# Patient Record
Sex: FEMALE | Race: WHITE | NOT HISPANIC OR LATINO | Employment: FULL TIME | URBAN - METROPOLITAN AREA
[De-identification: names, ages, dates, MRNs, and addresses within clinical notes are randomized per-mention and may not be internally consistent; named-entity substitution may affect disease eponyms.]

---

## 2017-05-04 DIAGNOSIS — L08.89 PITTED KERATOLYSIS: ICD-10-CM

## 2017-05-04 RX ORDER — CLINDAMYCIN PHOSPHATE 10 MG/G
GEL TOPICAL
Qty: 60 G | Refills: 3 | Status: CANCELLED | OUTPATIENT
Start: 2017-05-04

## 2017-05-05 NOTE — TELEPHONE ENCOUNTER
Team coordinators-Please call patient.  Did she request this medication?  It is not currently an active medication on med list.    Thank you!  TRACY CaseyN, RN

## 2017-05-05 NOTE — TELEPHONE ENCOUNTER
Medication Detail      Disp Refills Start End PAYTON   clindamycin (CLINDAGEL) 1 % gel (Discontinued) 60 g 3 6/18/2015 5/23/2016 --   Sig: Apply small amount to soles of feet nightly   Class: E-Prescribe   Reason for Discontinue: Therapy completed   Order: [id number]        Last Office Visit with [facility], [facility] or [facility] prescribing provider: 11/27/2015

## 2017-05-08 DIAGNOSIS — L08.89 PITTED KERATOLYSIS: ICD-10-CM

## 2017-05-08 NOTE — TELEPHONE ENCOUNTER
Medication Detail      Disp Refills Start End PAYTON   aluminum chloride (DRYSOL) 20 % external solution (Discontinued) 35 mL 3 6/18/2015 5/23/2016 --   Sig: Apply topically At Bedtime Apply to dry skin at bedtime once daily, wash off in morning.        Last Office Visit with [facility], [facility] or [facility] prescribing provider: 11/27/15

## 2017-05-18 ENCOUNTER — MYC MEDICAL ADVICE (OUTPATIENT)
Dept: FAMILY MEDICINE | Facility: CLINIC | Age: 42
End: 2017-05-18

## 2017-05-18 DIAGNOSIS — L08.89 PITTED KERATOLYSIS: ICD-10-CM

## 2017-05-18 RX ORDER — CLINDAMYCIN PHOSPHATE 10 MG/G
GEL TOPICAL
Qty: 60 G | Refills: 3 | Status: SHIPPED | OUTPATIENT
Start: 2017-05-18 | End: 2018-06-20

## 2017-08-16 ENCOUNTER — MYC MEDICAL ADVICE (OUTPATIENT)
Dept: FAMILY MEDICINE | Facility: CLINIC | Age: 42
End: 2017-08-16

## 2018-02-20 ENCOUNTER — OFFICE VISIT (OUTPATIENT)
Dept: FAMILY MEDICINE | Facility: CLINIC | Age: 43
End: 2018-02-20
Payer: COMMERCIAL

## 2018-02-20 VITALS
BODY MASS INDEX: 33.99 KG/M2 | HEART RATE: 78 BPM | DIASTOLIC BLOOD PRESSURE: 82 MMHG | WEIGHT: 204 LBS | SYSTOLIC BLOOD PRESSURE: 133 MMHG | TEMPERATURE: 98.5 F | RESPIRATION RATE: 16 BRPM | HEIGHT: 65 IN

## 2018-02-20 DIAGNOSIS — G43.009 MIGRAINE WITHOUT AURA AND WITHOUT STATUS MIGRAINOSUS, NOT INTRACTABLE: Primary | ICD-10-CM

## 2018-02-20 DIAGNOSIS — F32.1 MODERATE MAJOR DEPRESSION (H): ICD-10-CM

## 2018-02-20 DIAGNOSIS — G89.29 CHRONIC INTRACTABLE HEADACHE, UNSPECIFIED HEADACHE TYPE: ICD-10-CM

## 2018-02-20 DIAGNOSIS — R51.9 CHRONIC INTRACTABLE HEADACHE, UNSPECIFIED HEADACHE TYPE: ICD-10-CM

## 2018-02-20 PROCEDURE — 99214 OFFICE O/P EST MOD 30 MIN: CPT | Performed by: FAMILY MEDICINE

## 2018-02-20 NOTE — MR AVS SNAPSHOT
"              After Visit Summary   2/20/2018    Ruthie Nash    MRN: [id number]           Patient Information     Date Of Birth          1975        Visit Information        Provider Department      2/20/2018 3:20 PM Julia Bashir MD [facility]        Today's Diagnoses     Migraine without aura and without status migrainosus, not intractable    -  1    Chronic intractable headache, unspecified headache type        Moderate major depression (H)           Follow-ups after your visit        Who to contact     If you have questions or need follow up information about today's clinic visit or your schedule please contact [facility] directly at [phone].  Normal or non-critical lab and imaging results will be communicated to you by MyChart, letter or phone within 4 business days after the clinic has received the results. If you do not hear from us within 7 days, please contact the clinic through [employer] or phone. If you have a critical or abnormal lab result, we will notify you by phone as soon as possible.  Submit refill requests through [employer] or call your pharmacy and they will forward the refill request to us. Please allow 3 business days for your refill to be completed.          Additional Information About Your Visit        MyChart Information     [employer] gives you secure access to your electronic health record. If you see a primary care provider, you can also send messages to your care team and make appointments. If you have questions, please call your primary care clinic.  If you do not have a primary care provider, please call [phone] and they will assist you.        Care EveryWhere ID     This is your Care EveryWhere ID. This could be used by other organizations to access your [facility] medical records  [id number]        Your Vitals Were     Pulse Temperature Respirations Height Last Period Breastfeeding?    78 98.5  F (36.9  C) (Oral) 16 5' 5\" (1.651 m) " 02/15/2018 (Approximate) Unknown    BMI (Body Mass Index)                   33.95 kg/m2            Blood Pressure from Last 3 Encounters:   02/20/18 133/82   06/08/16 99/64   05/24/16 105/67    Weight from Last 3 Encounters:   02/20/18 204 lb (92.5 kg)   06/08/16 183 lb (83 kg)   05/24/16 178 lb (80.7 kg)              We Performed the Following     DEPRESSION ACTION PLAN (DAP)        Primary Care Provider Office Phone # Fax #    Julia Bashir [phone] [address]  [facility] [address]        Equal Access to Services     BRENT WILEY : Hadii fly girono Poppy, waaxda lufilomenaadaha, qaybta kaalmada kiran, nicolas anderson . [facility] [phone].    ATENCIÓN: Si habla español, tiene a [place] disposición servicios gratuitos de asistencia lingüística. [facility] [phone].    We comply with applicable federal civil rights laws and [place] laws. We do not discriminate on the basis of race, color, national origin, age, disability, sex, sexual orientation, or gender identity.            Thank you!     Thank you for choosing [facility]  for your care. Our goal is always to provide you with excellent care. Hearing back from our patients is one way we can continue to improve our services. Please take a few minutes to complete the written survey that you may receive in the mail after your visit with us. Thank you!             Your Updated Medication List - Protect others around you: Learn how to safely use, store and throw away your medicines at www.disposemymeds.org.          This list is accurate as of 2/20/18 11:59 PM.  Always use your most recent med list.                   Brand Name Dispense Instructions for use Diagnosis    aluminum chloride 20 % external solution    DRYSOL    35 mL    Apply topically At Bedtime Apply to dry skin at bedtime once daily, wash off in morning.    Pitted keratolysis       clindamycin 1 % topical gel    CLINDAGEL     60 g    Apply small amount to soles of feet nightly    Pitted keratolysis       FISH OIL PO           PRENATAL VITAMINS PO           TYLENOL PO      Take 325-650 mg by mouth every 4 hours as needed for mild pain or fever        VITAMIN D (CHOLECALCIFEROL) PO      Take by mouth daily

## 2018-02-20 NOTE — LETTER
My Depression Action Plan  Name: Ruthie Nash   Date of Birth 1975  Date: 2/20/2018    My doctor: Julia Bashir   My clinic: [address] [address] [phone]          GREEN    ZONE   Good Control    What it looks like:     Things are going generally well. You have normal up s and down s. You may even feel depressed from time to time, but bad moods usually last less than a day.   What you need to do:  1. Continue to care for yourself (see self care plan)  2. Check your depression survival kit and update it as needed  3. Follow your physician s recommendations including any medication.  4. Do not stop taking medication unless you consult with your physician first.           YELLOW         ZONE Getting Worse    What it looks like:     Depression is starting to interfere with your life.     It may be hard to get out of bed; you may be starting to isolate yourself from others.    Symptoms of depression are starting to last most all day and this has happened for several days.     You may have suicidal thoughts but they are not constant.   What you need to do:     1. Call your care team, your response to treatment will improve if you keep your care team informed of your progress. Yellow periods are signs an adjustment may need to be made.     2. Continue your self-care, even if you have to fake it!    3. Talk to someone in your support network    4. Open up your depression survival kit           RED    ZONE Medical Alert - Get Help    What it looks like:     Depression is seriously interfering with your life.     You may experience these or other symptoms: You can t get out of bed most days, can t work or engage in other necessary activities, you have trouble taking care of basic hygiene, or basic responsibilities, thoughts of suicide or death that will not go away, self-injurious behavior.     What you need to do:  1. Call your care team  and request a same-day appointment. If they are not available (weekends or after hours) call your local crisis line, emergency room or 911.      Electronically signed by: Talisha Cohen, February 20, 2018    Depression Self Care Plan / Survival Kit    Self-Care for Depression  Here s the deal. Your body and mind are really not as separate as most people think.  What you do and think affects how you feel and how you feel influences what you do and think. This means if you do things that people who feel good do, it will help you feel better.  Sometimes this is all it takes.  There is also a place for medication and therapy depending on how severe your depression is, so be sure to consult with your medical provider and/ or Behavioral Health Consultant if your symptoms are worsening or not improving.     In order to better manage my stress, I will:    Exercise  Get some form of exercise, every day. This will help reduce pain and release endorphins, the  feel good  chemicals in your brain. This is almost as good as taking antidepressants!  This is not the same as joining a gym and then never going! (they count on that by the way ) It can be as simple as just going for a walk or doing some gardening, anything that will get you moving.      Hygiene   Maintain good hygiene (Get out of bed in the morning, Make your bed, Brush your teeth, Take a shower, and Get dressed like you were going to work, even if you are unemployed).  If your clothes don't fit try to get ones that do.    Diet  I will strive to eat foods that are good for me, drink plenty of water, and avoid excessive sugar, caffeine, alcohol, and other mood-altering substances.  Some foods that are helpful in depression are: complex carbohydrates, B vitamins, flaxseed, fish or fish oil, fresh fruits and vegetables.    Psychotherapy  I agree to participate in Individual Therapy (if recommended).    Medication  If prescribed medications, I agree to take them.  Missing  doses can result in serious side effects.  I understand that drinking alcohol, or other illicit drug use, may cause potential side effects.  I will not stop my medication abruptly without first discussing it with my provider.    Staying Connected With Others  I will stay in touch with my friends, family members, and my primary care provider/team.    Use your imagination  Be creative.  We all have a creative side; it doesn t matter if it s oil painting, sand castles, or mud pies! This will also kick up the endorphins.    Witness Beauty  (AKA stop and smell the roses) Take a look outside, even in mid-winter. Notice colors, textures. Watch the squirrels and birds.     Service to others  Be of service to others.  There is always someone else in need.  By helping others we can  get out of ourselves  and remember the really important things.  This also provides opportunities for practicing all the other parts of the program.    Humor  Laugh and be silly!  Adjust your TV habits for less news and crime-drama and more comedy.    Control your stress  Try breathing deep, massage therapy, biofeedback, and meditation. Find time to relax each day.     My support system    Clinic Contact:  Phone number:    Contact 1:  Phone number:    Contact 2:  Phone number:    Church/:  Phone number:    Therapist:  Phone number:    Local crisis center:    Phone number:    Other community support:  Phone number:

## 2018-02-20 NOTE — PROGRESS NOTES
SUBJECTIVE:   Ruthie Nash is a 42 year old female who presents to clinic today for the following health issues:    Headaches      Duration: 10 years     Description  Location: bilateral in the frontal area, bilateral in the temporal area   Character: dull pain  Frequency:  Once a mouth when pt has her period.    Duration:  1 week    Intensity:  moderate    Accompanying signs and symptoms:    Precipitating or Alleviating factors:  Nausea/vomiting: sometimes  Dizziness: no  Weakness or numbness: no  Visual changes: wavy/zigzag lines  Fever: no   Sinus or URI symptoms no     History  Head trauma: no   Family history of migraines: no   Previous tests for headaches: YES  Neurologist evaluations: no  Able to do daily activities when headache present: no  Wake with headaches: YES  Daily pain medication use: no    Therapies tried and outcome: Ibuprofen (Advil, Motrin)    Outcome - usually effective  Frequent/daily pain medication use: no    Depression  PHQ-9 SCORE 3/15/2010 10/10/2012 1/27/2015   Total Score 3 8 4    currently under good control, manages well, feels she is functioning well.    Patient Active Problem List   Diagnosis     Insomnia     Atopic rhinitis     Adjustment disorder with anxiety     CARDIOVASCULAR SCREENING; LDL GOAL LESS THAN 160     Migraine     Chronic headache     Hyperhidrosis of feet     Pitted keratolysis     Skin tag     Pain in joint, upper arm     Aftercare for healing traumatic fracture of upper arm     Other postprocedural status(V45.89)     Low back pain     Lumbago     Pain in joint, pelvic region and thigh     Moderate major depression (H)     Body aches     Vitamin D deficiency     Headache     LSIL (low grade squamous intraepithelial lesion) on Pap smear     Generalized anxiety disorder     Laryngitis     Cervicalgia     History reviewed. No pertinent surgical history.    Social History   Substance Use Topics     Smoking status: Never Smoker     Smokeless tobacco: Never Used      "Alcohol use Yes      Comment: social     Family History   Problem Relation Age of Onset     Allergies Mother      Hypertension Mother      MENTAL ILLNESS Mother      Hypertension Father      MENTAL ILLNESS Father      Family History Negative Paternal Grandmother      HEART DISEASE Maternal Grandmother      Obesity Maternal Grandmother      DIABETES Maternal Grandmother      CANCER Maternal Grandfather      HEART DISEASE Paternal Grandfather      Obesity Paternal Grandfather      DIABETES Paternal Grandfather      Family History Negative Sister      Family History Negative Brother      Family History Negative Brother          Allergies   Allergen Reactions     Hay Fever & [A.R.M.]      Mold      BP Readings from Last 3 Encounters:   02/20/18 133/82   06/08/16 99/64   05/24/16 105/67    Wt Readings from Last 3 Encounters:   02/20/18 204 lb (92.5 kg)   06/08/16 183 lb (83 kg)   05/24/16 178 lb (80.7 kg)                    Reviewed and updated as needed this visit by clinical staff       Reviewed and updated as needed this visit by Provider         ROS:  Constitutional, HEENT, cardiovascular, pulmonary, GI, , musculoskeletal, neuro, skin, endocrine and psych systems are negative, except as otherwise noted.    OBJECTIVE:     /82  Pulse 78  Temp 98.5  F (36.9  C) (Oral)  Resp 16  Ht 5' 5\" (1.651 m)  Wt 204 lb (92.5 kg)  LMP 02/15/2018 (Approximate)  Breastfeeding? Unknown  BMI 33.95 kg/m2  Body mass index is 33.95 kg/(m^2).  GENERAL: healthy, alert and no distress  EYES: Eyes grossly normal to inspection, PERRL and conjunctivae and sclerae normal  HENT: ear canals and TM's normal, nose and mouth without ulcers or lesions  NECK: no adenopathy, no asymmetry, masses, or scars and thyroid normal to palpation  RESP: lungs clear to auscultation - no rales, rhonchi or wheezes  CV: regular rate and rhythm, normal S1 S2, no S3 or S4, no murmur, click or rub, no peripheral edema and peripheral pulses strong  MS: no " gross musculoskeletal defects noted, no edema  SKIN: no suspicious lesions or rashes  NEURO: Normal strength and tone, sensory exam grossly normal, mentation intact, cranial nerves 2-12 intact and DTR's normal and symmetric , gait normal.  PSYCH: mentation appears normal, affect normal/bright    Diagnostic Test Results:  none     ASSESSMENT/PLAN:     1. Migraine without aura and without status migrainosus, not intractable  with  2. Chronic intractable headache, unspecified headache type  Discussed prescription options including abortive and prophylactic medications, she elected for over the counter treatments for now.    3. Moderate major depression (H)  Controlled, at goal  - DEPRESSION ACTION PLAN (DAP)      Julia Bashir MD  [facility]

## 2018-02-23 PROBLEM — R51.9 CHRONIC INTRACTABLE HEADACHE, UNSPECIFIED HEADACHE TYPE: Status: ACTIVE | Noted: 2018-02-23

## 2018-02-23 PROBLEM — G89.29 CHRONIC INTRACTABLE HEADACHE, UNSPECIFIED HEADACHE TYPE: Status: ACTIVE | Noted: 2018-02-23

## 2018-02-23 PROBLEM — G43.009 MIGRAINE WITHOUT AURA AND WITHOUT STATUS MIGRAINOSUS, NOT INTRACTABLE: Status: ACTIVE | Noted: 2018-02-23

## 2018-04-01 ENCOUNTER — HEALTH MAINTENANCE LETTER (OUTPATIENT)
Age: 43
End: 2018-04-01

## 2018-06-20 ENCOUNTER — MYC REFILL (OUTPATIENT)
Dept: FAMILY MEDICINE | Facility: CLINIC | Age: 43
End: 2018-06-20

## 2018-06-20 DIAGNOSIS — L08.89 PITTED KERATOLYSIS: ICD-10-CM

## 2018-06-20 NOTE — TELEPHONE ENCOUNTER
"Requested Prescriptions   Pending Prescriptions Disp Refills     clindamycin (CLINDAMAX) 1 % topical gel [Pharmacy Med Name: CLINDAMYCIN PHOSPHATE 1% GEL]  Last Written Prescription Date:  05/18/2017  Last Fill Quantity: 60 g,  # refills: 3   Last Office Visit: 2/20/2018   Future Office Visit:      60 g 3     Sig: APPLY A SMALL AMOUNT TO SOLES OF FEET NIGHTLY    Topical Acne Medications Protocol Passed    6/20/2018  9:14 AM       Passed - Patient is 12 years of age or older       Passed - Recent (12 mo) or future (30 days) visit within the authorizing provider's specialty    Patient had office visit in the last 12 months or has a visit in the next 30 days with authorizing provider or within the authorizing provider's specialty.  See \"Patient Info\" tab in inbasket, or \"Choose Columns\" in Meds & Orders section of the refill encounter.            DRYSOL 20 % external solution [Pharmacy Med Name: DRYSOL 20% SOLN]  Last Written Prescription Date:  05/08/2017  Last Fill Quantity: 35 mL,  # refills: 3   Last Office Visit: 2/20/2018   Future Office Visit:      35 mL 3     Sig: APPLY TOPICALLY TO DRY SKIN AT BEDTIME ONCE DAILY. WASH OFF IN MORNING.    Miscellaneous Dermatologic Agents Passed    6/20/2018  9:14 AM       Passed - Recent (12 mo) or future (30 days) visit within the authorizing provider's specialty    Patient had office visit in the last 12 months or has a visit in the next 30 days with authorizing provider or within the authorizing provider's specialty.  See \"Patient Info\" tab in inbasket, or \"Choose Columns\" in Meds & Orders section of the refill encounter.           Passed - Refill request is not for Imiquimod, 5-Fluorouracil, or Finasteride     If Imiquimod, 5-Fluorouracil, or Finasteride, may refill if indicated in progress notes.          Passed - Patient is 24 mos old or older          "

## 2018-06-20 NOTE — TELEPHONE ENCOUNTER
"Requested Prescriptions   Pending Prescriptions Disp Refills     aluminum chloride (DRYSOL) 20 % external solution  Last Written Prescription Date:  05/08/2017  Last Fill Quantity: 35 mL,  # refills: 3   Last Office Visit: 2/20/2018   Future Office Visit:      35 mL 3     Sig: Apply topically At Bedtime Apply to dry skin at bedtime once daily, wash off in morning.    Miscellaneous Dermatologic Agents Passed    6/20/2018  9:40 AM       Passed - Recent (12 mo) or future (30 days) visit within the authorizing provider's specialty    Patient had office visit in the last 12 months or has a visit in the next 30 days with authorizing provider or within the authorizing provider's specialty.  See \"Patient Info\" tab in inbasket, or \"Choose Columns\" in Meds & Orders section of the refill encounter.           Passed - Refill request is not for Imiquimod, 5-Fluorouracil, or Finasteride     If Imiquimod, 5-Fluorouracil, or Finasteride, may refill if indicated in progress notes.          Passed - Patient is 24 mos old or older        clindamycin (CLINDAGEL) 1 % topical gel  Last Written Prescription Date:  05/18/2017  Last Fill Quantity: 60 g,  # refills: 3   Last Office Visit: 2/20/2018   Future Office Visit:      60 g 3     Sig: Apply small amount to soles of feet nightly    Topical Acne Medications Protocol Passed    6/20/2018  9:40 AM       Passed - Patient is 12 years of age or older       Passed - Recent (12 mo) or future (30 days) visit within the authorizing provider's specialty    Patient had office visit in the last 12 months or has a visit in the next 30 days with authorizing provider or within the authorizing provider's specialty.  See \"Patient Info\" tab in inbasket, or \"Choose Columns\" in Meds & Orders section of the refill encounter.              "

## 2018-06-20 NOTE — TELEPHONE ENCOUNTER
Message from MyChart:  Original authorizing provider: MD Ruthie Soni Charity would like a refill of the following medications:  aluminum chloride (DRYSOL) 20 % external solution [Julia Basihr MD]  clindamycin (CLINDAGEL) 1 % topical gel [Julia Bashir MD]    Preferred pharmacy: [place], [place] - [address]    Comment:

## 2018-06-25 RX ORDER — ALUMINUM CHLORIDE 20 %
SOLUTION, NON-ORAL TOPICAL
Qty: 0.1 ML | Refills: 0 | OUTPATIENT
Start: 2018-06-25

## 2018-06-25 RX ORDER — CLINDAMYCIN PHOSPHATE 10 MG/G
GEL TOPICAL
Qty: 60 G | Refills: 1 | Status: SHIPPED | OUTPATIENT
Start: 2018-06-25 | End: 2019-08-13

## 2018-06-25 RX ORDER — CLINDAMYCIN PHOSPHATE 10 MG/G
GEL TOPICAL
Qty: 0.1 G | Refills: 0 | OUTPATIENT
Start: 2018-06-25

## 2018-06-28 ENCOUNTER — MYC MEDICAL ADVICE (OUTPATIENT)
Dept: FAMILY MEDICINE | Facility: CLINIC | Age: 43
End: 2018-06-28

## 2018-06-28 DIAGNOSIS — N92.0 EXCESSIVE OR FREQUENT MENSTRUATION: Primary | ICD-10-CM

## 2018-06-29 PROBLEM — N92.0 EXCESSIVE OR FREQUENT MENSTRUATION: Status: ACTIVE | Noted: 2018-06-29

## 2018-07-13 ENCOUNTER — OFFICE VISIT (OUTPATIENT)
Dept: OBGYN | Facility: CLINIC | Age: 43
End: 2018-07-13
Payer: COMMERCIAL

## 2018-07-13 VITALS
BODY MASS INDEX: 33.87 KG/M2 | HEIGHT: 65 IN | SYSTOLIC BLOOD PRESSURE: 134 MMHG | DIASTOLIC BLOOD PRESSURE: 88 MMHG | WEIGHT: 203.3 LBS | TEMPERATURE: 99.1 F | HEART RATE: 97 BPM

## 2018-07-13 DIAGNOSIS — G43.011 INTRACTABLE MIGRAINE WITHOUT AURA AND WITH STATUS MIGRAINOSUS: Primary | ICD-10-CM

## 2018-07-13 PROCEDURE — 99203 OFFICE O/P NEW LOW 30 MIN: CPT | Performed by: OBSTETRICS & GYNECOLOGY

## 2018-07-13 RX ORDER — PROPRANOLOL HYDROCHLORIDE 20 MG/1
20 TABLET ORAL 2 TIMES DAILY
Qty: 180 TABLET | Refills: 1 | Status: SHIPPED | OUTPATIENT
Start: 2018-07-13 | End: 2018-07-13

## 2018-07-13 RX ORDER — PROPRANOLOL HYDROCHLORIDE 20 MG/1
20 TABLET ORAL 2 TIMES DAILY
Qty: 180 TABLET | Refills: 1 | Status: SHIPPED | OUTPATIENT
Start: 2018-07-13 | End: 2019-06-21

## 2018-07-13 RX ORDER — ACETAMINOPHEN AND CODEINE PHOSPHATE 120; 12 MG/5ML; MG/5ML
SOLUTION ORAL
COMMUNITY
Start: 2017-10-20 | End: 2018-11-20

## 2018-07-13 NOTE — PROGRESS NOTES
CC/HPI:  43 year old  presents for discussion of   Insomnia  Headaches.  Does get vision changes, describes zigzag flashing lights that starts in the center of her vision and moves towards the periphery.  Present her whole life except during pregnancy. Insomnia and headaches were better. Has had a Mirena for a while, didn't change anything. Still has Mirena. Still gets periods.   Has been doing norethindrone for a few months. Has been skipping the fourth week thinking it was a placebo week and going straight into the next pack.  Does feel like she feels better in general on the norethindrone.  Headache usually right before/during her period.  Feels just generally unwell.      Past Medical History:   Diagnosis Date     Body aches      Chronic headache      LENORE (generalised anxiety disorder) 2015     Headache      Insomnia 12/10/2008     LSIL (low grade squamous intraepithelial lesion) on Pap smear     also +HPV, colps WNL x two       No past surgical history on file.    Family History   Problem Relation Age of Onset     Allergies Mother      Hypertension Mother      Mental Illness Mother      Hypertension Father      Mental Illness Father      Family History Negative Paternal Grandmother      HEART DISEASE Maternal Grandmother      Obesity Maternal Grandmother      Diabetes Maternal Grandmother      Cancer Maternal Grandfather      HEART DISEASE Paternal Grandfather      Obesity Paternal Grandfather      Diabetes Paternal Grandfather      Family History Negative Sister      Family History Negative Brother      Family History Negative Brother        Social History     Social History     Marital status: Single     Spouse name: N/A     Number of children: N/A     Years of education: N/A     Occupational History     Not on file.     Social History Main Topics     Smoking status: Never Smoker     Smokeless tobacco: Never Used     Alcohol use Yes      Comment: social     Drug use: No     Sexual activity:  "Yes     Partners: Male     Birth control/ protection: Inserts     Other Topics Concern     Parent/Sibling W/ Cabg, Mi Or Angioplasty Before 65f 55m? No     Social History Narrative         Current Outpatient Prescriptions:      Acetaminophen (TYLENOL PO), Take 325-650 mg by mouth every 4 hours as needed for mild pain or fever, Disp: , Rfl:      aluminum chloride (DRYSOL) 20 % external solution, Apply topically At Bedtime Apply to dry skin at bedtime once daily, wash off in morning., Disp: 35 mL, Rfl: 1     clindamycin (CLINDAGEL) 1 % topical gel, Apply small amount to soles of feet nightly, Disp: 60 g, Rfl: 1     levonorgestrel (MIRENA) 20 MCG/24HR IUD, 1 each by Intrauterine route once, Disp: , Rfl:      norethindrone (MICRONOR) 0.35 MG per tablet, , Disp: , Rfl:      Omega-3 Fatty Acids (FISH OIL PO), , Disp: , Rfl:      Prenatal Multivit-Min-Fe-FA (PRENATAL VITAMINS PO), , Disp: , Rfl:      VITAMIN D, CHOLECALCIFEROL, PO, Take by mouth daily, Disp: , Rfl:     Allergies   Allergen Reactions     Hay Fever & [A.R.M.]      Mold        Exam:  Vitals:    18 0802   BP: 134/88   Pulse: 97   Temp: 99.1  F (37.3  C)   TempSrc: Oral   Weight: 203 lb 4.8 oz (92.2 kg)   Height: 5' 5\" (1.651 m)     Body mass index is 33.83 kg/(m^2).     Exam:  Constitutional: healthy, alert and no distress  Head: Normocephalic. No masses, lesions, tenderness or abnormalities  Psychiatric: mentation appears normal and affect normal/bright      A/P:  43 year old  with menstrual migraines  Reviewed headache history the patient agree that she seems to have migraines associated with her menses.  Discussed that combined oral contraceptive pills are contraindicated given her history of visual aura with her migraines.  Discussed that suppression of menses with Mirena IUD asked at the end organ of the uterus and is not significantly changed circulating hormones.  The strategies designed to reduce the heaviness of the menses but would not " be expected to improve menstrual migraines.  Patient reports perceived improvement while on low-dose progestin only oral contraceptive pills.  There is no contraindication to continuing with this strategy.  We discussed how progestin only OCPs are organized and that it is the same pill throughout the 4 weeks of the medication.  We discussed strategies for prevention of migraine.  We discussed a trial of propanolol for prevention.  In chart review it appears she may have tried this in 2015 but Ruthie does not remember this trial, if she adhered to it, or if it was effective.   We discussed that some patients use chaste berry tree extract also called Vitex also called castus chirs for premenstrual dysphoric disorder.  We discussed that premenstrual dysphoric disorder can have physical and psychological manifestations.  We discussed that there have been a couple of randomized placebo controlled double blinded studies with chaste berry tree extract for premenstrual dysphoric disorder.  I have had many patients uses herbal supplement with minimal to no side effects or adverse effects.  I have had 1 or 2 patients experience worsening of their symptoms.    At the conclusion of this consultation Ruthie decided she would like to try propanolol for prevention of migraine.  We discussed starting at 20 mg twice daily, that it takes at least 3 months duration of a trial, and that the medication may need to be titrated up.  We discussed possibly adding chaste berry tree extract but to add one medication at a time.  Return to clinic in 3 months.    Greater than 50% of this 40 minute appointment was spent in counseling on above issues.

## 2018-07-13 NOTE — MR AVS SNAPSHOT
After Visit Summary   7/13/2018    Ruthie Nash    MRN: [id number]           Patient Information     Date Of Birth          1975        Visit Information        Provider Department      7/13/2018 8:00 AM Marissa Skaggs MD [facility]        Today's Diagnoses     Intractable migraine without aura and with status migrainosus    -  1       Follow-ups after your visit        Your next 10 appointments already scheduled     Oct 12, 2018  8:00 AM CDT   Office Visit with Marissa Skaggs MD   [facility] ([facility])    [address] [place] [id number] [phone]           Bring a current list of meds and any records pertaining to this visit. For Physicals, please bring immunization records and any forms needing to be filled out. Please arrive 10 minutes early to complete paperwork.              Who to contact     If you have questions or need follow up information about today's clinic visit or your schedule please contact [facility] directly at [phone].  Normal or non-critical lab and imaging results will be communicated to you by MyChart, letter or phone within 4 business days after the clinic has received the results. If you do not hear from us within 7 days, please contact the clinic through [email or web address] or phone. If you have a critical or abnormal lab result, we will notify you by phone as soon as possible.  Submit refill requests through DioGenix or call your pharmacy and they will forward the refill request to us. Please allow 3 business days for your refill to be completed.          Additional Information About Your Visit        MyChart Information     [employer] gives you secure access to your electronic health record. If you see a primary care provider, you can also send messages to your care team and make appointments. If you have questions, please call your primary care clinic.  If you do  "not have a primary care provider, please call [phone] and they will assist you.        Care EveryWhere ID     This is your Care EveryWhere ID. This could be used by other organizations to access your [facility] medical records  [id number]        Your Vitals Were     Pulse Temperature Height Last Period Breastfeeding? BMI (Body Mass Index)    97 99.1  F (37.3  C) (Oral) 5' 5\" (1.651 m) 06/20/2018 Yes 33.83 kg/m2       Blood Pressure from Last 3 Encounters:   07/13/18 134/88   02/20/18 133/82   06/08/16 99/64    Weight from Last 3 Encounters:   07/13/18 203 lb 4.8 oz (92.2 kg)   02/20/18 204 lb (92.5 kg)   06/08/16 183 lb (83 kg)              Today, you had the following     No orders found for display         Today's Medication Changes          These changes are accurate as of 7/13/18 11:59 PM.  If you have any questions, ask your nurse or doctor.               Start taking these medicines.        Dose/Directions    propranolol 20 MG tablet   Commonly known as:  INDERAL   Used for:  Intractable migraine without aura and with status migrainosus   Started by:  Marissa Skaggs MD        Dose:  20 mg   Take 1 tablet (20 mg) by mouth 2 times daily   Quantity:  180 tablet   Refills:  1            Where to get your medicines      These medications were sent to [facility] [address]  [address] [facility] [address]     Phone:  [phone]     propranolol 20 MG tablet                Primary Care Provider Office Phone # Fax #    Julia Bashir [phone]       [address]  [facility] [address]        Equal Access to Services     [facility]: Natalya Mcwilliams, waestebanda lujustina, qaabhijeetta kaalmada kiran, nicolas barnes. So [facility] [phone].    ATENCIÓN: Si habla español, tiene a [place] disposición servicios gratuitos de asistencia lingüística. Llame al [phone].    We comply with applicable " federal civil rights laws and [place] laws. We do not discriminate on the basis of race, color, national origin, age, disability, sex, sexual orientation, or gender identity.            Thank you!     Thank you for choosing [facility]  for your care. Our goal is always to provide you with excellent care. Hearing back from our patients is one way we can continue to improve our services. Please take a few minutes to complete the written survey that you may receive in the mail after your visit with us. Thank you!             Your Updated Medication List - Protect others around you: Learn how to safely use, store and throw away your medicines at www.disposemymeds.org.          This list is accurate as of 7/13/18 11:59 PM.  Always use your most recent med list.                   Brand Name Dispense Instructions for use Diagnosis    aluminum chloride 20 % external solution    DRYSOL    35 mL    Apply topically At Bedtime Apply to dry skin at bedtime once daily, wash off in morning.    Pitted keratolysis       clindamycin 1 % topical gel    CLINDAGEL    60 g    Apply small amount to soles of feet nightly    Pitted keratolysis       FISH OIL PO           levonorgestrel 20 MCG/24HR IUD    MIRENA     1 each by Intrauterine route once        norethindrone 0.35 MG per tablet    MICRONOR          PRENATAL VITAMINS PO           propranolol 20 MG tablet    INDERAL    180 tablet    Take 1 tablet (20 mg) by mouth 2 times daily    Intractable migraine without aura and with status migrainosus       TYLENOL PO      Take 325-650 mg by mouth every 4 hours as needed for mild pain or fever        VITAMIN D (CHOLECALCIFEROL) PO      Take by mouth daily

## 2018-11-27 ENCOUNTER — TRANSFERRED RECORDS (OUTPATIENT)
Dept: HEALTH INFORMATION MANAGEMENT | Facility: CLINIC | Age: 43
End: 2018-11-27

## 2018-12-04 ENCOUNTER — OFFICE VISIT (OUTPATIENT)
Dept: OBGYN | Facility: CLINIC | Age: 43
End: 2018-12-04
Payer: COMMERCIAL

## 2018-12-04 VITALS
WEIGHT: 188 LBS | HEART RATE: 99 BPM | DIASTOLIC BLOOD PRESSURE: 86 MMHG | BODY MASS INDEX: 31.28 KG/M2 | SYSTOLIC BLOOD PRESSURE: 128 MMHG

## 2018-12-04 DIAGNOSIS — G43.821 MENSTRUAL MIGRAINE WITH STATUS MIGRAINOSUS, NOT INTRACTABLE: Primary | ICD-10-CM

## 2018-12-04 DIAGNOSIS — Z30.017 INSERTION OF IMPLANTABLE SUBDERMAL CONTRACEPTIVE: ICD-10-CM

## 2018-12-04 PROCEDURE — 99213 OFFICE O/P EST LOW 20 MIN: CPT | Mod: 25 | Performed by: OBSTETRICS & GYNECOLOGY

## 2018-12-04 PROCEDURE — 11981 INSERTION DRUG DLVR IMPLANT: CPT | Performed by: OBSTETRICS & GYNECOLOGY

## 2018-12-04 NOTE — MR AVS SNAPSHOT
After Visit Summary   12/4/2018    Ruthie Nash    MRN: [id number]           Patient Information     Date Of Birth          1975        Visit Information        Provider Department      12/4/2018 8:30 AM Marissa Skaggs MD [facility]        Today's Diagnoses     Menstrual migraine with status migrainosus, not intractable    -  1    Insertion of implantable subdermal contraceptive           Follow-ups after your visit        Who to contact     If you have questions or need follow up information about today's clinic visit or your schedule please contact [facility] directly at [phone].  Normal or non-critical lab and imaging results will be communicated to you by MyChart, letter or phone within 4 business days after the clinic has received the results. If you do not hear from us within 7 days, please contact the clinic through Shop[employer]et or phone. If you have a critical or abnormal lab result, we will notify you by phone as soon as possible.  Submit refill requests through Scalix or call your pharmacy and they will forward the refill request to us. Please allow 3 business days for your refill to be completed.          Additional Information About Your Visit        MyChart Information     [employer] gives you secure access to your electronic health record. If you see a primary care provider, you can also send messages to your care team and make appointments. If you have questions, please call your primary care clinic.  If you do not have a primary care provider, please call [phone] and they will assist you.        Care EveryWhere ID     This is your Care EveryWhere ID. This could be used by other organizations to access your [facility] medical records  [id number]        Your Vitals Were     Pulse BMI (Body Mass Index)                99 31.28 kg/m2           Blood Pressure from Last 3 Encounters:   12/04/18 128/86   07/13/18 134/88   02/20/18 133/82     Weight from Last 3 Encounters:   12/04/18 188 lb (85.3 kg)   07/13/18 203 lb 4.8 oz (92.2 kg)   02/20/18 204 lb (92.5 kg)              We Performed the Following     ETONOGESTREL IMPLANT SYSTEM     INSERTION NON-BIODEGRADABLE DRUG DELIVERY IMPLANT          Today's Medication Changes          These changes are accurate as of 12/4/18 11:59 PM.  If you have any questions, ask your nurse or doctor.               Start taking these medicines.        Dose/Directions    etonogestrel 68 MG Impl   Commonly known as:  IMPLANON   Used for:  Insertion of implantable subdermal contraceptive   Started by:  Marissa Skaggs MD        Dose:  1 each   1 each (68 mg) by Subdermal route once for 1 dose   Quantity:  1 each   Refills:  0            Where to get your medicines      Information about where to get these medications is not yet available     ! Ask your nurse or doctor about these medications     etonogestrel 68 MG Impl                Primary Care Provider Office Phone # Fax #    Julia Bashir [phone] [address] [place] [address]        Equal Access to Services     [facility]: Hadii fly ku hadasho Socurtis, waaxda luqadaha, qaybta kaalmada adeegyapantera, nicolas anderson . So [facility] [phone].    ATENCIÓN: Si habla español, tiene a [place] disposición servicios gratuitos de asistencia lingüística. Llame al [phone].    We comply with applicable federal civil rights laws and [place] laws. We do not discriminate on the basis of race, color, national origin, age, disability, sex, sexual orientation, or gender identity.            Thank you!     Thank you for choosing [facility]  for your care. Our goal is always to provide you with excellent care. Hearing back from our patients is one way we can continue to improve our services. Please take a few minutes to complete the written survey that you may receive in the mail after your visit  with us. Thank you!             Your Updated Medication List - Protect others around you: Learn how to safely use, store and throw away your medicines at www.disposemymeds.org.          This list is accurate as of 12/4/18 11:59 PM.  Always use your most recent med list.                   Brand Name Dispense Instructions for use Diagnosis    aluminum chloride 20 % external solution    DRYSOL    35 mL    Apply topically At Bedtime Apply to dry skin at bedtime once daily, wash off in morning.    Pitted keratolysis       clindamycin 1 % external gel    CLINDAGEL    60 g    Apply small amount to soles of feet nightly    Pitted keratolysis       etonogestrel 68 MG Impl    IMPLANON    1 each    1 each (68 mg) by Subdermal route once for 1 dose    Insertion of implantable subdermal contraceptive       FISH OIL PO           levonorgestrel 20 MCG/24HR IUD    MIRENA     1 each by Intrauterine route once        norethindrone 0.35 MG tablet    MICRONOR    84 tablet    Take 1 tablet (0.35 mg) by mouth daily    Surveillance of previously prescribed contraceptive pill       PRENATAL VITAMINS PO           propranolol 20 MG tablet    INDERAL    180 tablet    Take 1 tablet (20 mg) by mouth 2 times daily    Intractable migraine without aura and with status migrainosus       TYLENOL PO      Take 325-650 mg by mouth every 4 hours as needed for mild pain or fever        VITAMIN D (CHOLECALCIFEROL) PO      Take by mouth daily

## 2018-12-04 NOTE — NURSING NOTE
"Chief Complaint   Patient presents with     Prenatal Care       Initial /86  Pulse 99  Wt 188 lb (85.3 kg)  BMI 31.28 kg/m2 Estimated body mass index is 31.28 kg/(m^2) as calculated from the following:    Height as of 18: 5' 5\" (1.651 m).    Weight as of this encounter: 188 lb (85.3 kg).  BP completed using cuff size: regular    Questioned patient about current smoking habits.  Pt. has never smoked.          The following HM Due: NONE      The following patient reported/Care Every where data was sent to:  P ABSTRACT QUALITY INITIATIVES [[id number]]        N/a      Natasha Noel MA       nexplanon insert  lot# [id number]  ndc # 7161-2290-77       "

## 2018-12-05 NOTE — PROGRESS NOTES
S:  Ruthie presents for follow-up of menstrual migraines.  She was last seen for this in 2018.  At that time we started propranolol for prophylaxis with intent to titrate up as needed she did take the propanolol since the last appointment but took it only daily for the first few months then realize she was supposed to be taking it twice a day.  She has been taking it twice a day for about the last month but has  noticed no difference in her headaches.  She has Mirena IUD in place.  She has been taking norethindrone in addition because she previously felt like it did help her headaches.  She would like to start combination oral contraceptive pills.  She has been tracking her.  And she has been getting migraines and sleep disturbance during her period every month.    O:  Vitals:    18 0841   BP: 128/86   Pulse: 99   Weight: 188 lb (85.3 kg)     Gen: well appearing    A/P:  43 year old  with menstrual migraines  Discussed that combined oral contraceptive pills are not an option for her given her history of migraine with visual neurologic aura.  This has been discussed with her previously.  Discussed that it is unclear if she has had an adequate trial of propanolol as she is not been taking the minimum recommended starting dose.  Discussed other options for attempt at management.  We could try suppressing her ovulation with the Nexplanon which is more effective at suppressing ovulation than oral contraceptive pills even combined.  The norethindrone progesterone only pills that she has been on and obviously not suppressing her ovulation that she is having regular monthly periods.  She asked about hysterectomy.  We discussed this is unlikely to help with her migraines unless she has bilateral oophorectomy which I would not recommend due to her age; she would likely experience a severe symptomatic surgical menopause.   After discussion today, Ruthie would like to try Nexplanon, which was placed.   Follow-up in  2019.    NEXPLANON INSERTION PROCEDURE    Ruthie Nash is a 43 year old  who presents for Nexplanon insertion. Indication for nexplanon insertion is suppression of ovulation. No LMP recorded.. The patient is currently using IUD Mirena  for contraception.     Tests:  UPT not performed due to POPs and IUD in place.     A complete discussion of the risks and benefits of Nexplanon use and the details of the insertion procedure was held with the patient. The anticipated bleeding profile was specifically discussed and patient voiced understanding. All questions were answered. A consent form was signed.      Prior to the beginning of the procedure the team paused to verify the patient's identity, as well as the procedure to be performed and the correct side/site. All equipment required was ready and available. The patient was positioned appropriately.     Preprocedure medications: 1% plain lidocaine, 3 ml  Nexplanon Lot # lot# [id number]  ndc # 8223-5921-22    Patient's allergies were confirmed. The patient was placed in the supine position with her left (non-dominant) arm flexed at the elbow, externally rotated, and placed with her wrist parallel to her ear. The insertion site was identified 8-10 cm above the medial epicondyle of the humerus at the inner aspect of the arm. The insertion site was marked with a sterile marker. The direction of insertion was also indicated with a diomedes a few centimeters proximal. The insertion area was cleaned with betadine swabs and anesthetized with 2 ml of 1% lidocaine without epinephrine along the planned insertion tunnel.  The Nexplanon applicator was removed from its blister. The needle shield was removed, maintaining the applicator upright. The white implant was visualized in the needle tip. Counter-traction was applied to the skin at the marked needle insertion site.  The tip of the needle was inserted at the site, beveled side up, at a 30-degree angle. The  applicator was then lowered to a horizontal position. While lifting the skin with the tip of the needle, the needle was inserted to its full length. The slider was unlocked and moved fully back to the stop.   The 4 cm venice was palpated under the skin. The patient also palpated the venice. A pressure bandage was applied with sterile gauze. The patient was instructed to remove the bangage in several hours and replace with a band-aid.    The user card was filled out and given to the patient to keep.  The Patient Chart Label was completed and sent for scanning.    PLAN:   The patient was asked to contact the clinic for any fever/chills/insertion site pain or heavy bleeding.     FOLLOW-UP:  She was asked to follow up for any problems.     20 min was spent with this patient that does not include time spent on the procedure and over 50 % of the 20 min was spent counseling on menstrual migraines

## 2019-01-02 ENCOUNTER — OFFICE VISIT (OUTPATIENT)
Dept: FAMILY MEDICINE | Facility: CLINIC | Age: 44
End: 2019-01-02
Payer: COMMERCIAL

## 2019-01-02 ENCOUNTER — TELEPHONE (OUTPATIENT)
Dept: FAMILY MEDICINE | Facility: CLINIC | Age: 44
End: 2019-01-02

## 2019-01-02 VITALS
OXYGEN SATURATION: 98 % | HEART RATE: 89 BPM | TEMPERATURE: 98.6 F | BODY MASS INDEX: 31.65 KG/M2 | SYSTOLIC BLOOD PRESSURE: 139 MMHG | WEIGHT: 190 LBS | HEIGHT: 65 IN | DIASTOLIC BLOOD PRESSURE: 96 MMHG | RESPIRATION RATE: 18 BRPM

## 2019-01-02 DIAGNOSIS — M62.838 TRAPEZIUS MUSCLE SPASM: Primary | ICD-10-CM

## 2019-01-02 PROCEDURE — 99213 OFFICE O/P EST LOW 20 MIN: CPT | Performed by: FAMILY MEDICINE

## 2019-01-02 RX ORDER — PREDNISONE 20 MG/1
20 TABLET ORAL DAILY
Qty: 5 TABLET | Refills: 0 | Status: SHIPPED | OUTPATIENT
Start: 2019-01-02 | End: 2019-01-07

## 2019-01-02 RX ORDER — BACLOFEN 10 MG/1
10 TABLET ORAL 3 TIMES DAILY
Qty: 30 TABLET | Refills: 0 | Status: SHIPPED | OUTPATIENT
Start: 2019-01-02 | End: 2019-02-01

## 2019-01-02 ASSESSMENT — MIFFLIN-ST. JEOR: SCORE: 1527.71

## 2019-01-02 NOTE — PATIENT INSTRUCTIONS
"ASSESSMENT AND PLAN  1. Trapezius muscle spasm  Continue ibuprofen 600mg daily for 4-5 days .    Start baclofen at night and can use up to three times daily.     Warm packs 3-4 times daily for 15 minutes.     Taught non-weight bearing stretches to do 3-4 times/day.   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     If needing work note will call me Friday.     Pap smear due by February.     - baclofen (LIORESAL) 10 MG tablet; Take 1 tablet (10 mg) by mouth 3 times daily  Dispense: 30 tablet; Refill: 0  - [facility] PT, HAND, AND CHIROPRACTIC REFERRAL; Future        MYCHART FOR ON-LINE CARE(VISITS), LABS, REFILLS, MESSAGING, ETC [email or web address] , [phone]    E-VISIT: click \"on-line care, then request e-visit\".  E-visits work well for following up on issues we have discussed in clinic previously which may need new prescriptions, new prescriptions or substantial discussion. These are always done by me (Dr. Wegener).     ONCARE VISIT:  [email or web address]  - we treat nearly 50 common conditions through on-care.  These are done in an hour by on-call staff.     RADIOLOGY:  [facility]:  [phone]   [facility]: [phone]    Mammogram and Colonoscopy [phone]    Smoking Cessation: www.[email or web address], 3-907-627-PLAN (8187)      CONSUMER PRICE LINE for estimates of test costs:  [phone]       "

## 2019-01-02 NOTE — PROGRESS NOTES
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   History of back problems: no prior back problems  Any previous MRI or X-rays: None  Sees a specialist for back pain:  No  Therapies tried without relief: heat    Alleviating factors:   Improved by: None      Precipitating factors:  Worsened by: Sitting , standing  Lying           Problem list, Medication list, Allergies, and Medical/Social/Surgical histories reviewed in [facility] and updated as appropriate.  Labs reviewed in [facility]  BP Readings from Last 3 Encounters:   01/02/19 (!) 139/96   12/04/18 128/86   07/13/18 134/88    Wt Readings from Last 3 Encounters:   01/02/19 86.2 kg (190 lb)   12/04/18 85.3 kg (188 lb)   07/13/18 92.2 kg (203 lb 4.8 oz)                  Patient Active Problem List   Diagnosis     Insomnia     Atopic rhinitis     Adjustment disorder with anxiety     CARDIOVASCULAR SCREENING; LDL GOAL LESS THAN 160     Hyperhidrosis of feet     Pitted keratolysis     Skin tag     Pain in joint, upper arm     Aftercare for healing traumatic fracture of upper arm     Other postprocedural status(V45.89)     Low back pain     Lumbago     Pain in joint, pelvic region and thigh     Moderate major depression (H)     Body aches     Vitamin D deficiency     Headache     LSIL (low grade squamous intraepithelial lesion) on Pap smear     Generalized anxiety disorder     Laryngitis     Cervicalgia     Migraine without aura and without status migrainosus, not intractable     Chronic  intractable headache, unspecified headache type     Excessive or frequent menstruation     History reviewed. No pertinent surgical history.    Social History     Tobacco Use     Smoking status: Never Smoker     Smokeless tobacco: Never Used   Substance Use Topics     Alcohol use: Yes     Comment: social     Family History   Problem Relation Age of Onset     Allergies Mother      Hypertension Mother      Mental Illness Mother      Hypertension Father      Mental Illness Father      Family History Negative Paternal Grandmother      Heart Disease Maternal Grandmother      Obesity Maternal Grandmother      Diabetes Maternal Grandmother      Cancer Maternal Grandfather      Heart Disease Paternal Grandfather      Obesity Paternal Grandfather      Diabetes Paternal Grandfather      Family History Negative Sister      Family History Negative Brother      Family History Negative Brother          Current Outpatient Medications   Medication Sig Dispense Refill     Acetaminophen (TYLENOL PO) Take 325-650 mg by mouth every 4 hours as needed for mild pain or fever       aluminum chloride (DRYSOL) 20 % external solution Apply topically At Bedtime Apply to dry skin at bedtime once daily, wash off in morning. 35 mL 1     baclofen (LIORESAL) 10 MG tablet Take 1 tablet (10 mg) by mouth 3 times daily 30 tablet 0     clindamycin (CLINDAGEL) 1 % topical gel Apply small amount to soles of feet nightly 60 g 1     levonorgestrel (MIRENA) 20 MCG/24HR IUD 1 each by Intrauterine route once       norethindrone (MICRONOR) 0.35 MG per tablet Take 1 tablet (0.35 mg) by mouth daily 84 tablet 0     Omega-3 Fatty Acids (FISH OIL PO)        Prenatal Multivit-Min-Fe-FA (PRENATAL VITAMINS PO)        propranolol (INDERAL) 20 MG tablet Take 1 tablet (20 mg) by mouth 2 times daily 180 tablet 1     VITAMIN D, CHOLECALCIFEROL, PO Take by mouth daily       etonogestrel (IMPLANON) 68 MG IMPL 1 each (68 mg) by Subdermal route once for 1 dose 1 each 0  "    Allergies   Allergen Reactions     Hay Fever & [A.R.M.]      Mold      Recent Labs   Lab Test 10/11/12  1528   TSH 1.77        ROS:  Constitutional, HEENT, cardiovascular, pulmonary, GI, , musculoskeletal, neuro, skin, endocrine and psych systems are negative, except as otherwise noted.        OBJECTIVE:  BP (!) 139/96 (BP Location: Left arm, Patient Position: Supine, Cuff Size: Adult Regular)   Pulse 89   Temp 98.6  F (37  C) (Oral)   Resp 18   Ht 1.651 m (5' 5\")   Wt 86.2 kg (190 lb)   SpO2 98%   BMI 31.62 kg/m      EXAM:  GENERAL APPEARANCE: healthy, alert and no distress  Pain with palpation of right trapezius and rhomboid muscles which reproduces pain and radiating pain down right arm.    Less c-spine pine although some but not focal.     Strength 5/5 shoulder, elbow, wrist, hand, pincer grasp although does increase pain.   dtrs elbow,br, 1+/symmetric.           ASSESSMENT AND PLAN  Patient Instructions   ASSESSMENT AND PLAN  1. Trapezius muscle spasm  Continue ibuprofen 600mg daily for 4-5 days .    Start baclofen at night and can use up to three times daily.     Warm packs 3-4 times daily for 15 minutes.     Taught non-weight bearing stretches to do 3-4 times/day.   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     If needing work note will call me Friday.     Pap smear due by February with dr. Bashir.     Re-check blood pressue at physical. High today likely from pain, not high in past.     - baclofen (LIORESAL) 10 MG tablet; Take 1 tablet (10 mg) by mouth 3 times daily  Dispense: 30 tablet; Refill: 0  - [facility] PT, HAND, AND CHIROPRACTIC REFERRAL; Future        MYCHART FOR ON-LINE CARE(VISITS), LABS, REFILLS, MESSAGING, ETC [email or web address] , [phone]    E-VISIT: click \"on-line care, then request e-visit\".  E-visits work well for following up on issues we have discussed in clinic previously which may need new prescriptions, new prescriptions or substantial discussion. These are " always done by me (Dr. Wegener).     ONCARE VISIT:  [email or web address]  - we treat nearly 50 common conditions through on-care.  These are done in an hour by on-call staff.     RADIOLOGY:  [facility]:  [phone]   [facility]: [phone]    Mammogram and Colonoscopy [phone]    Smoking Cessation: www.quitplan.org, 9-690-128-PLAN (9091)      CONSUMER PRICE LINE for estimates of test costs:  [phone]             Joel Wegener, MD

## 2019-01-02 NOTE — TELEPHONE ENCOUNTER
Patient called and spoke with writer.    Per patient:  1. Five days of pain   A. Location: right upper back/side and up through neck  2. RUE fracture history but has never experienced pain like this  3. Pain worsened this AM  4. Does not think she injured self picking up her youngest-she picks child up frequently  5. Just took 2 Advil  6. Tingling of RUE for 5 days as well  7. No difficulty moving RUE  8. Denied loss of bowel/bladder function  9. Had professional massage on 12/29/18  10. Epsom salt baths  11. Heat/ice packs to affected area and stretching    Writer recommended:  1. Office visit for further evaluation.  Appt scheduled today with Dr. Wegener.  Appt date, time and location confirmed with patient  2. If pain becomes severe and/or loss of bowel/bladder function, go to emergency room for evaluation  3. Continue with home care measures  4. Call back with any new, worsening or changing symptoms    Patient verbalized understanding and in agreement with plan.  ALDO Xavier, TRACYN, RN

## 2019-01-03 ENCOUNTER — OFFICE VISIT (OUTPATIENT)
Dept: ORTHOPEDICS | Facility: CLINIC | Age: 44
End: 2019-01-03
Payer: COMMERCIAL

## 2019-01-03 ENCOUNTER — ANCILLARY PROCEDURE (OUTPATIENT)
Dept: GENERAL RADIOLOGY | Facility: CLINIC | Age: 44
End: 2019-01-03
Payer: COMMERCIAL

## 2019-01-03 VITALS — BODY MASS INDEX: 31.65 KG/M2 | HEART RATE: 92 BPM | WEIGHT: 190 LBS | OXYGEN SATURATION: 99 % | HEIGHT: 65 IN

## 2019-01-03 DIAGNOSIS — M54.2 NECK PAIN: Primary | ICD-10-CM

## 2019-01-03 DIAGNOSIS — M54.2 NECK PAIN: ICD-10-CM

## 2019-01-03 RX ORDER — TIZANIDINE 2 MG/1
2-4 TABLET ORAL 3 TIMES DAILY PRN
Qty: 30 TABLET | Refills: 0 | Status: SHIPPED | OUTPATIENT
Start: 2019-01-03 | End: 2021-01-11

## 2019-01-03 RX ORDER — PREDNISONE 20 MG/1
40 TABLET ORAL DAILY
Qty: 10 TABLET | Refills: 0 | Status: SHIPPED | OUTPATIENT
Start: 2019-01-03 | End: 2019-01-08

## 2019-01-03 ASSESSMENT — MIFFLIN-ST. JEOR: SCORE: 1527.71

## 2019-01-03 NOTE — PROGRESS NOTES
"[facility]  Orthopedics  Praneeth Hill MD  2019     Name: Ruthie Nash  MRN: [id number]  Age: 43 year old  : 1975  Referring provider: No ref. provider found     Chief Complaint: Pain of the Neck and Pain of the Right Arm         History of Present Illness:   Ruthie Nash is a 43 year old female who presents today for evaluation of cervical spine pain. She reports that her pain began about a week ago without inciting event or trauma. She localizes her pain on the right side of her neck and also endorses a tingling sensation in her right arm. Describes pain as a persistent \"shooting\" pain that wakes her up at night. Alleviates her pain by laying down. She has also tried epsom salt baths, heating pads, and stretching to minimal relief. She was seen yesterday at [facility] and was given a referral to physical therapy as well as stretching exercises to perform at home. She was prescribed baclofen and prednisone and has taken 3 doses of baclofen, but does not feel that it has helped. Has not started prednisone at this time.       Review of Systems:   A 10-point review of systems was obtained and is negative except for as noted in the HPI.     Medications:     Current Outpatient Medications:      Acetaminophen (TYLENOL PO), Take 325-650 mg by mouth every 4 hours as needed for mild pain or fever, Disp: , Rfl:      aluminum chloride (DRYSOL) 20 % external solution, Apply topically At Bedtime Apply to dry skin at bedtime once daily, wash off in morning., Disp: 35 mL, Rfl: 1     baclofen (LIORESAL) 10 MG tablet, Take 1 tablet (10 mg) by mouth 3 times daily, Disp: 30 tablet, Rfl: 0     clindamycin (CLINDAGEL) 1 % topical gel, Apply small amount to soles of feet nightly, Disp: 60 g, Rfl: 1     etonogestrel (IMPLANON) 68 MG IMPL, 1 each (68 mg) by Subdermal route once for 1 dose, Disp: 1 each, Rfl: 0     levonorgestrel (MIRENA) 20 MCG/24HR IUD, 1 each by Intrauterine route once, Disp: , Rfl:      norethindrone " "(MICRONOR) 0.35 MG per tablet, Take 1 tablet (0.35 mg) by mouth daily, Disp: 84 tablet, Rfl: 0     Omega-3 Fatty Acids (FISH OIL PO), , Disp: , Rfl:      predniSONE (DELTASONE) 20 MG tablet, Take 20 mg by mouth daily for 5 days., Disp: 5 tablet, Rfl: 0     Prenatal Multivit-Min-Fe-FA (PRENATAL VITAMINS PO), , Disp: , Rfl:      propranolol (INDERAL) 20 MG tablet, Take 1 tablet (20 mg) by mouth 2 times daily, Disp: 180 tablet, Rfl: 1     VITAMIN D, CHOLECALCIFEROL, PO, Take by mouth daily, Disp: , Rfl:     Allergies:  Hay fever  Mold    Past Medical History:  CBody aches   Chronic headache   LENORE  Headache   Insomnia   LSIL on Pap smear     Past Surgical History:  The patient does not have any pertinent past surgical history.    Social History:  Never smoked  Occasional alcohol use       Family History:  Mother: Allergies, hypertension, mental illness  Father: Hypertension, mental illness  Paternal grandmother: Heart disease, obesity, diabetes  Maternal grandfather: Cancer, obesity, diabetes    Physical Examination:  Pulse 92, height 1.651 m (5' 5\"), weight 86.2 kg (190 lb), SpO2 99 %, currently breastfeeding.   General: alert, pleasant, no distress  Neck/Spine: no TTP of spinous processes in cervical spine. Limited ROM with flexion, extension, rotation, tilting  with pain. negative TTP along paraspinous muscles and upper trapezius musculature along the right side. negative Periscapular pain.  negative tightness in this area. No noted bruising or skin discoloration. Spurlings negative  Neurologic: Reports altered sensation throughout right upper extremity compared to left. Strength 5/5 and symmetric throughout upper extremities.   Upper Extremities: warm, well perfused, no edema. Full ROM without pain in shoulder, elbow, wrist, and hand. Good capillary refill bilaterally      Imaging:   Radiographs of the cervical spine - 3 views (01/03/2019)  No osseus abnormalities.     I have independently reviewed the above imaging " studies; the results were discussed with the patient.       Assessment:   43 year old female with neck pain without acute injury or trauma with associated altered sensation in her right arm. Suspected cervical disc etiology..     Plan:   - Recommend the patient treat her pain with a 5 day course of prednisone 40mg, and tizanidine.  - I also recommend the patient continue staying active. She already has a physical therapy appointment scheduled at [place] for next Tuesday.   - If her symptoms do not improve, we may consider obtaining an MRI for further evaluation.     Praneeth Hill MD        Scribe Disclosure:   I, Cordell Warner, am serving as a scribe to document services personally performed by Praneeth Hill MD at this visit, based upon the provider's statements to me. All documentation has been reviewed by the aforementioned provider prior to being entered into the official medical record.

## 2019-01-03 NOTE — PROGRESS NOTES
SPORTS & ORTHOPEDIC WALK-IN VISIT 1/3/2019    Primary Care Physician: Dr. Bashir  Right upper trap/neck pain that started about a week ago. Seen at [facility] yesterday 1/2/19. Given referall to PT, note to be off work for 2 days, stretches, Rx for Baclofen and Prednisone. Has not taken any yet. Has tried Tylenol and Ibuprofen. Radiculopathy into R arm.      Reason for visit:     What part of your body is injured / painful?  Neck     What caused the injury /pain? No inciting event     How long ago did your injury occur or pain begin? a week ago    What are your most bothersome symptoms? Pain    How would you characterize your symptom?  dull and sharp    What makes your symptoms better? Heat    What makes your symptoms worse? Standing and Sitting, Lying down    Have you been previously seen for this problem? Yes, [facility]    Medical History:    Any recent changes to your medical history? No    Any new medication prescribed since last visit? No    Have you had surgery on this body part before? No    Social History:    Occupation: Therapist at [employer]    Handedness: Right    Exercise: 2-3 days/week    Review of Systems:    Do you have fever, chills, weight loss? No    Do you have any vision problems? No    Do you have any chest pain or edema? No    Do you have any shortness of breath or wheezing?  No    Do you have stomach problems? No    Do you have any numbness or focal weakness? Yes, down R arm    Do you have diabetes? No    Do you have problems with bleeding or clotting? No    Do you have an rashes or other skin lesions? No

## 2019-01-03 NOTE — LETTER
"1/3/2019       RE: Ruthie Nash  [address]  [place] [address]     Dear Colleague,    Thank you for referring your patient, Ruthie Nash, to the [facility] SPORTS AND ORTHOPAEDIC WALK IN CLINIC at [facility]. Please see a copy of my visit note below.    [facility]  Orthopedics  Praneeth Hill MD  2019     Name: Ruthie Nash  MRN: [id number]  Age: 43 year old  : 1975  Referring provider: No ref. provider found     Chief Complaint: Pain of the Neck and Pain of the Right Arm       History of Present Illness:   Ruthie Nash is a 43 year old female who presents today for evaluation of cervical spine pain. She reports that her pain began about a week ago without inciting event or trauma. She localizes her pain on the right side of her neck and also endorses a tingling sensation in her right arm. Describes pain as a persistent \"shooting\" pain that wakes her up at night. Alleviates her pain by laying down. She has also tried epsom salt baths, heating pads, and stretching to minimal relief. She was seen yesterday at [facility] and was given a referral to physical therapy as well as stretching exercises to perform at home. She was prescribed baclofen and prednisone and has taken 3 doses of baclofen, but does not feel that it has helped. Has not started prednisone at this time.       Review of Systems:   A 10-point review of systems was obtained and is negative except for as noted in the HPI.     Medications:   Current Outpatient Medications:      Acetaminophen (TYLENOL PO), Take 325-650 mg by mouth every 4 hours as needed for mild pain or fever, Disp: , Rfl:      aluminum chloride (DRYSOL) 20 % external solution, Apply topically At Bedtime Apply to dry skin at bedtime once daily, wash off in morning., Disp: 35 mL, Rfl: 1     baclofen (LIORESAL) 10 MG tablet, Take 1 tablet (10 mg) by mouth 3 times daily, Disp: 30 tablet, Rfl: 0     clindamycin (CLINDAGEL) 1 % topical " "gel, Apply small amount to soles of feet nightly, Disp: 60 g, Rfl: 1     etonogestrel (IMPLANON) 68 MG IMPL, 1 each (68 mg) by Subdermal route once for 1 dose, Disp: 1 each, Rfl: 0     levonorgestrel (MIRENA) 20 MCG/24HR IUD, 1 each by Intrauterine route once, Disp: , Rfl:      norethindrone (MICRONOR) 0.35 MG per tablet, Take 1 tablet (0.35 mg) by mouth daily, Disp: 84 tablet, Rfl: 0     Omega-3 Fatty Acids (FISH OIL PO), , Disp: , Rfl:      predniSONE (DELTASONE) 20 MG tablet, Take 20 mg by mouth daily for 5 days., Disp: 5 tablet, Rfl: 0     Prenatal Multivit-Min-Fe-FA (PRENATAL VITAMINS PO), , Disp: , Rfl:      propranolol (INDERAL) 20 MG tablet, Take 1 tablet (20 mg) by mouth 2 times daily, Disp: 180 tablet, Rfl: 1     VITAMIN D, CHOLECALCIFEROL, PO, Take by mouth daily, Disp: , Rfl:     Allergies:  Hay fever  Mold    Past Medical History:  CBody aches   Chronic headache   LENORE  Headache   Insomnia   LSIL on Pap smear     Past Surgical History:  The patient does not have any pertinent past surgical history.    Social History:  Never smoked  Occasional alcohol use       Family History:  Mother: Allergies, hypertension, mental illness  Father: Hypertension, mental illness  Paternal grandmother: Heart disease, obesity, diabetes  Maternal grandfather: Cancer, obesity, diabetes    Physical Examination:  Pulse 92, height 1.651 m (5' 5\"), weight 86.2 kg (190 lb), SpO2 99 %, currently breastfeeding.   General: alert, pleasant, no distress  Neck/Spine: no TTP of spinous processes in cervical spine. Limited ROM with flexion, extension, rotation, tilting  with pain. negative TTP along paraspinous muscles and upper trapezius musculature along the right side. negative Periscapular pain.  negative tightness in this area. No noted bruising or skin discoloration. Spurlings negative  Neurologic: Reports altered sensation throughout right upper extremity compared to left. Strength 5/5 and symmetric throughout upper extremities. "   Upper Extremities: warm, well perfused, no edema. Full ROM without pain in shoulder, elbow, wrist, and hand. Good capillary refill bilaterally    Imaging:   Radiographs of the cervical spine - 3 views (01/03/2019)  No osseus abnormalities.     I have independently reviewed the above imaging studies; the results were discussed with the patient.     Assessment:   43 year old female with neck pain without acute injury or trauma with associated altered sensation in her right arm. Suspected cervical disc etiology..     Plan:   - Recommend the patient treat her pain with a 5 day course of prednisone 40mg, and tizanidine.  - I also recommend the patient continue staying active. She already has a physical therapy appointment scheduled at [place] for next Tuesday.   - If her symptoms do not improve, we may consider obtaining an MRI for further evaluation.     Praneeth Hill MD    Scribe Disclosure:   I, Cordell Warner, am serving as a scribe to document services personally performed by Praneeth Hill MD at this visit, based upon the provider's statements to me. All documentation has been reviewed by the aforementioned provider prior to being entered into the official medical record.            SPORTS & ORTHOPEDIC WALK-IN VISIT 1/3/2019    Primary Care Physician: Dr. Bashir  Right upper trap/neck pain that started about a week ago. Seen at [facility] yesterday 1/2/19. Given referall to PT, note to be off work for 2 days, stretches, Rx for Baclofen and Prednisone. Has not taken any yet. Has tried Tylenol and Ibuprofen. Radiculopathy into R arm.      Reason for visit:     What part of your body is injured / painful?  Neck     What caused the injury /pain? No inciting event     How long ago did your injury occur or pain begin? a week ago    What are your most bothersome symptoms? Pain    How would you characterize your symptom?  dull and sharp    What makes your symptoms better? Heat    What makes your symptoms worse? Standing  and Sitting, Lying down    [place]    Medical History:    Any recent changes to your medical history? No    Any new medication prescribed since last visit? No    Have you had surgery on this body part before? No    Social History:    Occupation: Therapist at [employer]    Handedness: Right    Exercise: 2-3 days/week    Review of Systems:    Do you have fever, chills, weight loss? No    Do you have any vision problems? No    Do you have any chest pain or edema? No    Do you have any shortness of breath or wheezing?  No    Do you have stomach problems? No    Do you have any numbness or focal weakness? Yes, down R arm    Do you have diabetes? No    Do you have problems with bleeding or clotting? No    Do you have an rashes or other skin lesions? No

## 2019-01-04 ENCOUNTER — MYC MEDICAL ADVICE (OUTPATIENT)
Dept: ORTHOPEDICS | Facility: CLINIC | Age: 44
End: 2019-01-04

## 2019-01-08 ENCOUNTER — THERAPY VISIT (OUTPATIENT)
Dept: PHYSICAL THERAPY | Facility: CLINIC | Age: 44
End: 2019-01-08
Payer: COMMERCIAL

## 2019-01-08 DIAGNOSIS — M62.838 TRAPEZIUS MUSCLE SPASM: ICD-10-CM

## 2019-01-08 DIAGNOSIS — M54.2 CERVICALGIA: Primary | ICD-10-CM

## 2019-01-08 PROCEDURE — 97110 THERAPEUTIC EXERCISES: CPT | Mod: GP | Performed by: PHYSICAL THERAPIST

## 2019-01-08 PROCEDURE — 97161 PT EVAL LOW COMPLEX 20 MIN: CPT | Mod: GP | Performed by: PHYSICAL THERAPIST

## 2019-01-08 PROCEDURE — 97530 THERAPEUTIC ACTIVITIES: CPT | Mod: GP | Performed by: PHYSICAL THERAPIST

## 2019-01-08 NOTE — PROGRESS NOTES
Physical Therapy Initial Evaluation  January 8, 2019     Precautions/Restrictions/MD instructions: PT eval and treat.    Subjective:   Date of Onset: 1/2/19  MD order on 1/2/19  C/C: right sided neck pain, and anterior arm pain from axilla to her elbow. Also reports increased muscle soreness on the right side of the posterior shoulder. Denies numbness but reports tingling in her forearm and all fingers intermittently.  Quality of pain is dull and aching. Pains are described as intermittent in nature. Pain is worse: morning. Pain is rated 6/10.   History of symptoms: Pains began suddenly as the result of sleeping wrong and waking up with the pain at home. Since onset, symptoms are worsening. Previous episodes: history of left humerus fracture  Worsened by:  Sitting, driving, laying down, turning her head to the right  Alleviated by: standing. Laying flat on her back. Corticosteroids pack. Chiropractor.  General health as reported by patient: good  Pertinent medical/surgical history: overweight, migraines/headaches. A month and a half ago she got a progesterone implant in her left arm to reduce inflammation. She denies painful cough, painful peripheral motor deficit, recent inging in the ears or pain with swallowing, unexplained weight loss, significant current illness or recent hospital admission. She denies any regional implanted devices. She denies history of surgery in the area.  Imaging: none. Current occupational status: MH therapist. Patient's goals are: decrease pain, improve tolerance to driving, sitting, taking care of kids, and sitting at work for 45 min intervals. Return to MD:  PRN.     Objective:  CERVICAL:    Posture: forward shoulder and shoulder elevation in sitting  Posture Correction: lumbar roll, not well tolerated    Neurological:    Motor Deficit:  Myotomes L R   C4 (shoulder elevation) 5/5 5/5   C5 (shoulder abduction) 5/5 5/5   C6 (elbow flexion) 5/5 5/5   C7 (elbow extension) 5/5 3+/5   C8  (thumb extension) 5/5 5/5   T1 (finger add/abd) 5/5 5/5    Strength (lb) WNL WNL     Sensory Deficit, Reflexes, Dural Signs: Reduced light touch sensation at right C6-7 dermatomes, intact to light touch sensation in all other UE dermatomes. Brachioradialis, triceps and biceps reflex 1+ B.    AROM: (Major, Moderate, Minimal or Nil loss)  Movement Loss Ildefonso Mod Min Nil Pain   Protrusion   x     Flexion  x   x   Retraction   x     Extension x    xx   Left Rotation  x   x   Right Rotation x    xx   Left Side Bending   x     Right Side bending x    x     Repeated movement testing:   (During: produces, abolishes, increases, decreases, no effect, centralizing, peripheralizing; After: better, worse, no better, no worse, no effect, centralized, peripheralized)       Symptoms During Symptoms After ROM increased ROM decreased No Effect   RET produce No worse   x   Rep RET produce No worse X, improved R elbow extension       Static Tests:   Alar Ligament test: negative  Transverse Ligament Test:negative  Distraction:positive for sx reduction at right axilla and tingling in R UE    Palpation: tender to palpation at B upper traps, levator scapulae, cervical paraspinals and suboccipitals.    Assessment/Plan:    The patient is a 43 year old female with chief complaint of right neck, and anterior axillary pain, and tingling in R UE.    The patient has the following significant findings with corresponding treatment plan.  Diagnosis 1:  cervicalgia    Pain -  hot/cold therapy, electric stimulation, mechanical traction, manual therapy, splint/taping/bracing/orthotics, self management, education, directional preference exercise and home program  Decreased ROM/flexibility - manual therapy and therapeutic exercise  Decreased strength - therapeutic exercise and therapeutic activities  Decreased proprioception - neuro re-education and therapeutic activities  Impaired muscle performance - neuro re-education  Decreased function -  therapeutic activities  Impaired posture - neuro re-education    Therapy Evaluation Codes:   1) History comprised of:   Personal factors that impact the plan of care:      Age, Work status and mom of 3 young children.    Comorbidity factors that impact the plan of care are:      Migraines/headaches and Overweight.     Medications impacting care: Muscle relaxant and Steroids.  2) Examination of Body Systems comprised of:   Body structures and functions that impact the plan of care:      Cervical spine, Shoulder and Thoracic Spine.   Activity limitations that impact the plan of care are:      Bathing, Cooking, Driving, Dressing, Lifting, Reading/Computer work, Sitting, Standing, Walking, Working, Sleeping and Laying down.   Clinical presentation characteristics are:    Stable/Uncomplicated.  3) Presentation comprised of:   Presentation scored as Low complexity with uncomplicated characteristics..  4) Decision-Making    Low complexity using standardized patient assessment instrument and/or measureable assessment of functional outcome.  Cumulative Therapy Evaluation is: Low complexity.    Previous and current functional limitations:  (See Goal Flow Sheet for this information)    Short term and Long term goals: (See Goal Flow Sheet for this information)     Communication ability:  Patient appears to be able to clearly communicate and understand verbal and written communication and follow directions correctly.  Treatment Explanation - The following has been discussed with the patient: RX ordered/plan of care, anticipated outcomes, and possible risks and side effects.  This patient would benefit from PT intervention to resume normal activities.   Rehab potential is good.    Frequency:  1 X week, once daily  Duration:  for 8 weeks  Discharge Plan: Achieve all LTGs, be independent in home treatment program, and reach maximal therapeutic benefit.    Please refer to the daily flowsheet for treatment today, total treatment time  and time spent performing 1:1 timed codes.

## 2019-01-10 ENCOUNTER — MYC MEDICAL ADVICE (OUTPATIENT)
Dept: ORTHOPEDICS | Facility: CLINIC | Age: 44
End: 2019-01-10

## 2019-01-10 DIAGNOSIS — M54.2 NECK PAIN: Primary | ICD-10-CM

## 2019-01-16 ENCOUNTER — THERAPY VISIT (OUTPATIENT)
Dept: PHYSICAL THERAPY | Facility: CLINIC | Age: 44
End: 2019-01-16
Payer: COMMERCIAL

## 2019-01-16 DIAGNOSIS — M54.2 CERVICALGIA: Primary | ICD-10-CM

## 2019-01-16 DIAGNOSIS — M54.2 CERVICAL PAIN: Primary | ICD-10-CM

## 2019-01-16 PROCEDURE — 97110 THERAPEUTIC EXERCISES: CPT | Mod: GP | Performed by: PHYSICAL THERAPIST

## 2019-01-16 PROCEDURE — 97140 MANUAL THERAPY 1/> REGIONS: CPT | Mod: GP | Performed by: PHYSICAL THERAPIST

## 2019-01-16 RX ORDER — IBUPROFEN 600 MG/1
600 TABLET, FILM COATED ORAL EVERY 6 HOURS PRN
Qty: 18 TABLET | Refills: 0 | Status: SHIPPED | OUTPATIENT
Start: 2019-01-16 | End: 2020-01-16

## 2019-01-16 RX ORDER — CYCLOBENZAPRINE HCL 5 MG
5 TABLET ORAL 3 TIMES DAILY PRN
Qty: 15 TABLET | Refills: 0 | Status: SHIPPED | OUTPATIENT
Start: 2019-01-16 | End: 2019-01-26

## 2019-01-18 ENCOUNTER — THERAPY VISIT (OUTPATIENT)
Dept: PHYSICAL THERAPY | Facility: CLINIC | Age: 44
End: 2019-01-18
Payer: COMMERCIAL

## 2019-01-18 DIAGNOSIS — M54.12 CERVICAL RADICULOPATHY: Primary | ICD-10-CM

## 2019-01-18 PROCEDURE — 97530 THERAPEUTIC ACTIVITIES: CPT | Mod: GP | Performed by: PHYSICAL THERAPIST

## 2019-01-18 PROCEDURE — 97110 THERAPEUTIC EXERCISES: CPT | Mod: GP | Performed by: PHYSICAL THERAPIST

## 2019-01-18 PROCEDURE — 97012 MECHANICAL TRACTION THERAPY: CPT | Mod: GP | Performed by: PHYSICAL THERAPIST

## 2019-01-29 ENCOUNTER — TELEPHONE (OUTPATIENT)
Dept: FAMILY MEDICINE | Facility: CLINIC | Age: 44
End: 2019-01-29

## 2019-01-29 ENCOUNTER — VIRTUAL VISIT (OUTPATIENT)
Dept: FAMILY MEDICINE | Facility: CLINIC | Age: 44
End: 2019-01-29
Payer: COMMERCIAL

## 2019-01-29 DIAGNOSIS — Z20.828 EXPOSURE TO INFLUENZA: Primary | ICD-10-CM

## 2019-01-29 PROCEDURE — 99207 ZZC NO BILLABLE SERVICE THIS VISIT: CPT | Performed by: FAMILY MEDICINE

## 2019-01-29 RX ORDER — OSELTAMIVIR PHOSPHATE 75 MG/1
75 CAPSULE ORAL DAILY
Qty: 10 CAPSULE | Refills: 0 | Status: SHIPPED | OUTPATIENT
Start: 2019-01-29 | End: 2019-08-07

## 2019-01-29 NOTE — TELEPHONE ENCOUNTER
Patient's child, José Miguel Alexis (9-) was seen today for the flu.  Dr Hayes wanted her to do an e-visit to get prophylactic TamiFlu for the rest of the family:  Ruthie Nash (1977)  Sanchez Alexis (4-)  Rachid Alexis (5-7-2011)  Tianna Alexis (11-)    Ruthie stated that she was unable to initiate an e-visit (Dr Hayes was not an option).    Needs these called into [facility].      Please call when done or if you have any questions.  OK to leave message on voicemail.

## 2019-01-29 NOTE — TELEPHONE ENCOUNTER
Dr. Hayes-Please advise if you would be willing to do telephone visit with patient and family members.    Thank you!  TRACY DolanN, RN

## 2019-01-29 NOTE — TELEPHONE ENCOUNTER
Per Dr. Hayes:  1. Will complete telephone visits with patient and family members  2. Sanchez will need to have separate telephone visit  3. Rachid and Tianna telephone visits can be completed when calling patient    Writer called patient and informed her of above and reviewed consent needs to be obtained.    Patient verbalized understanding and in agreement with plan.    Patient stated she thinks she is starting to develop influenza symptoms-sore throat.  informed patient to notify Dr. Hayes what symptoms she is experiencing.    ALDO Xavier, TRACYN, RN

## 2019-01-29 NOTE — PROGRESS NOTES
Start phone call - 4:37 pm   End phone call - 4:41 pm   She feels tingling in her throat and her heads feel off.   Discussed treatment options with pt and her children.   Sent script to pharmacy.   Follow if symptoms worsen or fail to improve.    oseltamivir (TAMIFLU) 75 MG capsule 10 capsule 0 1/29/2019 2/8/2019 --   Sig - Route: Take 1 capsule (75 mg) by mouth daily for 10 days - Oral   Sent to pharmacy as: oseltamivir (TAMIFLU) 75 MG capsule   Class: E-Prescribe   Order: [id number]   E-Prescribing Status: Receipt confirmed by pharmacy (1/29/2019  4:05 PM CST)

## 2019-02-01 ENCOUNTER — THERAPY VISIT (OUTPATIENT)
Dept: PHYSICAL THERAPY | Facility: CLINIC | Age: 44
End: 2019-02-01
Payer: COMMERCIAL

## 2019-02-01 DIAGNOSIS — M54.2 NECK PAIN: Primary | ICD-10-CM

## 2019-02-01 PROCEDURE — 97110 THERAPEUTIC EXERCISES: CPT | Mod: GP | Performed by: PHYSICAL THERAPIST

## 2019-02-01 PROCEDURE — 97012 MECHANICAL TRACTION THERAPY: CPT | Mod: GP | Performed by: PHYSICAL THERAPIST

## 2019-02-01 PROCEDURE — 97530 THERAPEUTIC ACTIVITIES: CPT | Mod: GP | Performed by: PHYSICAL THERAPIST

## 2019-02-18 ENCOUNTER — THERAPY VISIT (OUTPATIENT)
Dept: PHYSICAL THERAPY | Facility: CLINIC | Age: 44
End: 2019-02-18
Payer: COMMERCIAL

## 2019-02-18 DIAGNOSIS — M54.2 NECK PAIN: Primary | ICD-10-CM

## 2019-02-18 PROCEDURE — 97012 MECHANICAL TRACTION THERAPY: CPT | Mod: GP | Performed by: PHYSICAL THERAPIST

## 2019-02-18 PROCEDURE — 97110 THERAPEUTIC EXERCISES: CPT | Mod: GP | Performed by: PHYSICAL THERAPIST

## 2019-02-18 PROCEDURE — 97112 NEUROMUSCULAR REEDUCATION: CPT | Mod: GP | Performed by: PHYSICAL THERAPIST

## 2019-03-19 ENCOUNTER — THERAPY VISIT (OUTPATIENT)
Dept: PHYSICAL THERAPY | Facility: CLINIC | Age: 44
End: 2019-03-19
Payer: COMMERCIAL

## 2019-03-19 DIAGNOSIS — M54.2 NECK PAIN: Primary | ICD-10-CM

## 2019-03-19 PROCEDURE — 97110 THERAPEUTIC EXERCISES: CPT | Mod: GP | Performed by: PHYSICAL THERAPIST

## 2019-03-19 PROCEDURE — 97112 NEUROMUSCULAR REEDUCATION: CPT | Mod: GP | Performed by: PHYSICAL THERAPIST

## 2019-03-19 PROCEDURE — 97012 MECHANICAL TRACTION THERAPY: CPT | Mod: GP | Performed by: PHYSICAL THERAPIST

## 2019-03-19 NOTE — PROGRESS NOTES
DISCHARGE REPORT    Progress reporting period is from 1/8/19 to 3/19/19.       SUBJECTIVE  Subjective changes noted by patient:   Patient reports improving pain symptoms, ROM, strength and function. No pain with coughing. No tingling in hand. No sharp pain in arm pit and shoulder blade.     Current Pain level: 0/10.     Initial Pain level: 6/10.   Changes in function:  Yes (See Goal flowsheet attached for changes in current functional level)  Adverse reaction to treatment or activity: None    OBJECTIVE  Changes noted in objective findings:  Yes,   Objective: cervical AROM WNL throughout, pain with end range rotation bilaterally; decreased pain after repeated cervical RET EXT and self OP     ASSESSMENT/PLAN  Updated problem list and treatment plan: Diagnosis 1:  Neck pain    STG/LTGs have been met or progress has been made towards goals:  Yes (See Goal flow sheet completed today.)  Assessment of Progress: The patient's condition is improving.  Self Management Plans:  Patient is independent in a home treatment program.  Patient is independent in self management of symptoms.    Ruthie continues to require the following intervention to meet STG and LTG's:  PT intervention is no longer required to meet STG/LTG.    Recommendations:  This patient is ready to be discharged from therapy and continue their home treatment program.    Please refer to the daily flowsheet for treatment today, total treatment time and time spent performing 1:1 timed codes.

## 2019-04-02 ENCOUNTER — OFFICE VISIT (OUTPATIENT)
Dept: OBGYN | Facility: CLINIC | Age: 44
End: 2019-04-02
Payer: COMMERCIAL

## 2019-04-02 VITALS
HEART RATE: 112 BPM | DIASTOLIC BLOOD PRESSURE: 92 MMHG | WEIGHT: 193 LBS | BODY MASS INDEX: 32.15 KG/M2 | HEIGHT: 65 IN | SYSTOLIC BLOOD PRESSURE: 133 MMHG | OXYGEN SATURATION: 100 %

## 2019-04-02 DIAGNOSIS — Z12.4 SCREENING FOR MALIGNANT NEOPLASM OF CERVIX: ICD-10-CM

## 2019-04-02 DIAGNOSIS — Z30.432 ENCOUNTER FOR IUD REMOVAL: ICD-10-CM

## 2019-04-02 DIAGNOSIS — Z01.419 ENCOUNTER FOR GYNECOLOGICAL EXAMINATION WITHOUT ABNORMAL FINDING: Primary | ICD-10-CM

## 2019-04-02 PROCEDURE — 87624 HPV HI-RISK TYP POOLED RSLT: CPT | Performed by: OBSTETRICS & GYNECOLOGY

## 2019-04-02 PROCEDURE — G0145 SCR C/V CYTO,THINLAYER,RESCR: HCPCS | Performed by: OBSTETRICS & GYNECOLOGY

## 2019-04-02 PROCEDURE — 58301 REMOVE INTRAUTERINE DEVICE: CPT | Performed by: OBSTETRICS & GYNECOLOGY

## 2019-04-02 PROCEDURE — 99396 PREV VISIT EST AGE 40-64: CPT | Mod: 25 | Performed by: OBSTETRICS & GYNECOLOGY

## 2019-04-02 ASSESSMENT — MIFFLIN-ST. JEOR: SCORE: 1536.32

## 2019-04-02 NOTE — PROGRESS NOTES
Ruthie is a 44 year old  female who presents for annual exam.     Menses are irregular and bleeding all the time with Mirena lasting days and days.  Menses flow: normal, light, medium, heavy and spotty.  No LMP recorded (lmp unknown). Patient is not currently having periods (Reason: IUD).. Using IUD for contraception.  She is not currently considering pregnancy.  Besides routine health maintenance, she has no other health concerns today .  She is really happy with nexplanon placed at last visit.  She has noticed an significant decrease in her migraine frequency.  She is having frequent vaginal bleeding.  She is wondering if that is from the combination of the Nexplanon and IUD that she is using currently.  She also notes diffuse hair loss.  She still has plenty of hair remaining so she is not worried about it.  Wondering how to do Kegels.  GYNECOLOGIC HISTORY:  Menarche: Don't remember   Age at first intercourse: 19 Number of lifetime partners: unknown   Ruthie is sexually active with 01 male partner(s) and is currently in monogamous relationship with .    History sexually transmitted infections:No STD history and HPV  STI testing offered?  Declined  JUANCARLOS exposure: Unknown  History of abnormal Pap smear: YES - updated in Problem List and Health Maintenance accordingly  Family history of breast CA: No  Family history of uterine/ovarian CA: No    Family history of colon CA: No    HEALTH MAINTENANCE:  Cholesterol: (No results found for: CHOL History of abnormal lipids: No  Mammo: no . History of abnormal Mammo: No, never had one.  Regular Self Breast Exams: No  Calcium/Vitamin D intake: source:  dairy Adequate? Yes  TSH: (  TSH   Date Value Ref Range Status   10/11/2012 1.77 0.4 - 5.0 mU/L Final    )  Pap; (  Lab Results   Component Value Date    PAP NIL 2015    PAP NIL 10/10/2012    PAP NIL 2010    )    HISTORY:  Obstetric History       T3      L3     SAB0   TAB0   Ectopic0   Multiple0    Live Births3       # Outcome Date GA Lbr Kayden/2nd Weight Sex Delivery Anes PTL Lv   4 Term 16    M    MAAME   3 Term 13    F    MAAME   2 Term 11 [id number]   M    MAAME      Name: Rachid Arenas AB                 Past Medical History:   Diagnosis Date     Body aches      Chronic headache      LENORE (generalised anxiety disorder) 2015     Headache      Insomnia 12/10/2008     LSIL (low grade squamous intraepithelial lesion) on Pap smear     also +HPV, colps WNL x two     No past surgical history on file.  Family History   Problem Relation Age of Onset     Allergies Mother      Hypertension Mother      Mental Illness Mother      Hypertension Father      Mental Illness Father      Family History Negative Paternal Grandmother      Heart Disease Maternal Grandmother      Obesity Maternal Grandmother      Diabetes Maternal Grandmother      Cancer Maternal Grandfather      Heart Disease Paternal Grandfather      Obesity Paternal Grandfather      Diabetes Paternal Grandfather      Family History Negative Sister      Family History Negative Brother      Family History Negative Brother      Social History     Socioeconomic History     Marital status: Single     Spouse name: Not on file     Number of children: Not on file     Years of education: Not on file     Highest education level: Not on file   Occupational History     Not on file   Social Needs     Financial resource strain: Not on file     Food insecurity:     Worry: Not on file     Inability: Not on file     Transportation needs:     Medical: Not on file     Non-medical: Not on file   Tobacco Use     Smoking status: Never Smoker     Smokeless tobacco: Never Used   Substance and Sexual Activity     Alcohol use: Yes     Comment: social     Drug use: No     Sexual activity: Yes     Partners: Male     Birth control/protection: Inserts   Lifestyle     Physical activity:     Days per week: Not on file     Minutes per session: Not on file     Stress:  Not on file   Relationships     Social connections:     Talks on phone: Not on file     Gets together: Not on file     Attends Latter day service: Not on file     Active member of club or organization: Not on file     Attends meetings of clubs or organizations: Not on file     Relationship status: Not on file     Intimate partner violence:     Fear of current or ex partner: Not on file     Emotionally abused: Not on file     Physically abused: Not on file     Forced sexual activity: Not on file   Other Topics Concern     Parent/sibling w/ CABG, MI or angioplasty before 65F 55M? No   Social History Narrative     Not on file       Current Outpatient Medications:      Acetaminophen (TYLENOL PO), Take 325-650 mg by mouth every 4 hours as needed for mild pain or fever, Disp: , Rfl:      aluminum chloride (DRYSOL) 20 % external solution, Apply topically At Bedtime Apply to dry skin at bedtime once daily, wash off in morning., Disp: 35 mL, Rfl: 1     clindamycin (CLINDAGEL) 1 % topical gel, Apply small amount to soles of feet nightly, Disp: 60 g, Rfl: 1     etonogestrel (IMPLANON) 68 MG IMPL, 1 each (68 mg) by Subdermal route once for 1 dose, Disp: 1 each, Rfl: 0     ibuprofen (ADVIL/MOTRIN) 600 MG tablet, Take 1 tablet (600 mg) by mouth every 6 hours as needed for moderate pain, Disp: 18 tablet, Rfl: 0     levonorgestrel (MIRENA) 20 MCG/24HR IUD, 1 each by Intrauterine route once, Disp: , Rfl:      norethindrone (MICRONOR) 0.35 MG per tablet, Take 1 tablet (0.35 mg) by mouth daily, Disp: 84 tablet, Rfl: 0     Omega-3 Fatty Acids (FISH OIL PO), , Disp: , Rfl:      Prenatal Multivit-Min-Fe-FA (PRENATAL VITAMINS PO), , Disp: , Rfl:      propranolol (INDERAL) 20 MG tablet, Take 1 tablet (20 mg) by mouth 2 times daily, Disp: 180 tablet, Rfl: 1     tiZANidine (ZANAFLEX) 2 MG tablet, Take 1-2 tablets (2-4 mg) by mouth 3 times daily as needed for muscle spasms, Disp: 30 tablet, Rfl: 0     VITAMIN D, CHOLECALCIFEROL, PO, Take by  "mouth daily, Disp: , Rfl:      Allergies   Allergen Reactions     Hay Fever & [A.R.M.]      Mold        Past medical, surgical, social and family history were reviewed and updated in EPIC.    ROS:   C:     NEGATIVE for fever, chills, change in weight  I:       NEGATIVE for worrisome rashes, moles or lesions  E:     NEGATIVE for vision changes or irritation  E/M: NEGATIVE for ear, mouth and throat problems  R:     NEGATIVE for significant cough or SOB  CV:   NEGATIVE for chest pain, palpitations or peripheral edema  GI:     NEGATIVE for nausea, abdominal pain, heartburn, or change in bowel habits  :   NEGATIVE for frequency, dysuria, hematuria, vaginal discharge, or irregular bleeding  M:     NEGATIVE for significant arthralgias or myalgia  N:      NEGATIVE for weakness, dizziness or paresthesias  E:      NEGATIVE for temperature intolerance, skin/hair changes  P:      NEGATIVE for changes in mood or affect.    EXAM:  BP (!) 133/92   Pulse 112   Ht 1.651 m (5' 5\")   Wt 87.5 kg (193 lb)   LMP  (LMP Unknown)   SpO2 100%   Breastfeeding? No   BMI 32.12 kg/m     BMI: Body mass index is 32.12 kg/m .  Constitutional: healthy, alert and no distress  Head: Normocephalic. No masses, lesions, tenderness or abnormalities  Neck: Neck supple. Trachea midline. No adenopathy. Thyroid symmetric, normal size.   Cardiovascular: RRR.   Respiratory: Negative.   Breast: Breasts reveal mild symmetric fibrocystic densities, but there are no dominant, discrete, fixed or suspicious masses found.  Gastrointestinal: Abdomen soft, non-tender, non-distended. No masses, organomegaly.  :  Vulva:  No external lesions, normal female hair distribution, no inguinal adenopathy.    Urethra:  Midline, non-tender, well supported, no discharge  Vagina:  Moist, pink, no abnormal discharge, no lesions  Uterus:  Normal size, anteverted , non-tender, freely mobile  Ovaries:  No masses appreciated, non-tender, mobile  Rectal Exam: " deferred  Musculoskeletal: extremities normal  Skin: no suspicious lesions or rashes  Psychiatric: Affect appropriate, cooperative,mentation appears normal.     COUNSELING:   Reviewed preventive health counseling, as reflected in patient instructions       Regular exercise       Healthy diet/nutrition       Contraception   reports that  has never smoked. she has never used smokeless tobacco.    Body mass index is 32.12 kg/m .  Weight management plan: Discussed healthy diet and exercise guidelines  FRAX Risk Assessment    ASSESSMENT:  44 year old female with satisfactory annual exam  (Z01.419) Encounter for gynecological examination without abnormal finding  (primary encounter diagnosis)  Comment:   Plan: Gave instructions on kegels    (Z12.4) Screening for malignant neoplasm of cervix  Comment:   Plan: Pap imaged thin layer screen with HPV -         recommended age 30 - 65 years (select HPV order        below), HPV High Risk Types DNA Cervical            (Z30.432) Encounter for IUD removal  Comment:   Plan: REMOVE INTRAUTERINE DEVICE        Likely will experience improvement in bleeding. Let me know if not.    Procedure IUD REMOVAL:    Bimanual exam was performed.  The IUD strings were palpable.  Speculum introduced with patient in the dorsal lithotomy position.  The IUD string was visualized and grasped.  The IUD was removed easily.  Pt tolerated well.

## 2019-04-02 NOTE — NURSING NOTE
"Chief Complaint   Patient presents with     Contraception     IUD removal and pap smear.        Initial BP (!) 133/92   Pulse 112   Ht 1.651 m (5' 5\")   Wt 87.5 kg (193 lb)   LMP  (LMP Unknown)   SpO2 100%   Breastfeeding? No   BMI 32.12 kg/m   Estimated body mass index is 32.12 kg/m  as calculated from the following:    Height as of this encounter: 1.651 m (5' 5\").    Weight as of this encounter: 87.5 kg (193 lb).  BP completed using cuff size: regular    Questioned patient about current smoking habits.  Pt. has never smoked.          The following HM Due: pap smear and mammogram.       The following patient reported/Care Every where data was sent to:  P ABSTRACT QUALITY INITIATIVES [[id number]]  n/a      patient has appointment for today              "

## 2019-04-04 LAB
COPATH REPORT: NORMAL
PAP: NORMAL

## 2019-04-05 LAB
FINAL DIAGNOSIS: NORMAL
HPV HR 12 DNA CVX QL NAA+PROBE: NEGATIVE
HPV16 DNA SPEC QL NAA+PROBE: NEGATIVE
HPV18 DNA SPEC QL NAA+PROBE: NEGATIVE
SPECIMEN DESCRIPTION: NORMAL
SPECIMEN SOURCE CVX/VAG CYTO: NORMAL

## 2019-06-03 DIAGNOSIS — Z30.41 SURVEILLANCE OF PREVIOUSLY PRESCRIBED CONTRACEPTIVE PILL: ICD-10-CM

## 2019-06-03 RX ORDER — ACETAMINOPHEN AND CODEINE PHOSPHATE 120; 12 MG/5ML; MG/5ML
0.35 SOLUTION ORAL DAILY
Qty: 84 TABLET | OUTPATIENT
Start: 2019-06-03

## 2019-06-03 NOTE — TELEPHONE ENCOUNTER
Pharmacy sent refill request for norethindrone.  Pt had this last prescribed 11/2018.  Is she still taking it?  Has nexplanon.  Had IUD removed 4/2019.  Please sign if approved.  Sabra Bae RN

## 2019-06-21 ENCOUNTER — OFFICE VISIT (OUTPATIENT)
Dept: OBGYN | Facility: CLINIC | Age: 44
End: 2019-06-21
Payer: COMMERCIAL

## 2019-06-21 VITALS
DIASTOLIC BLOOD PRESSURE: 80 MMHG | WEIGHT: 180 LBS | HEIGHT: 65 IN | HEART RATE: 84 BPM | SYSTOLIC BLOOD PRESSURE: 114 MMHG | OXYGEN SATURATION: 99 % | BODY MASS INDEX: 29.99 KG/M2

## 2019-06-21 DIAGNOSIS — Z30.41 SURVEILLANCE OF PREVIOUSLY PRESCRIBED CONTRACEPTIVE PILL: Primary | ICD-10-CM

## 2019-06-21 DIAGNOSIS — G43.821 MENSTRUAL MIGRAINE WITH STATUS MIGRAINOSUS, NOT INTRACTABLE: ICD-10-CM

## 2019-06-21 DIAGNOSIS — G43.011 INTRACTABLE MIGRAINE WITHOUT AURA AND WITH STATUS MIGRAINOSUS: ICD-10-CM

## 2019-06-21 PROCEDURE — 99213 OFFICE O/P EST LOW 20 MIN: CPT | Performed by: OBSTETRICS & GYNECOLOGY

## 2019-06-21 RX ORDER — ACETAMINOPHEN AND CODEINE PHOSPHATE 120; 12 MG/5ML; MG/5ML
0.35 SOLUTION ORAL DAILY
Qty: 84 TABLET | Refills: 3 | Status: SHIPPED | OUTPATIENT
Start: 2019-06-21 | End: 2020-08-14

## 2019-06-21 RX ORDER — PROPRANOLOL HYDROCHLORIDE 20 MG/1
20 TABLET ORAL 2 TIMES DAILY
Qty: 180 TABLET | Refills: 1 | Status: SHIPPED | OUTPATIENT
Start: 2019-06-21 | End: 2020-03-06

## 2019-06-21 ASSESSMENT — MIFFLIN-ST. JEOR: SCORE: 1477.35

## 2019-06-21 NOTE — NURSING NOTE
"Chief Complaint   Patient presents with     Consult       Initial /80   Pulse 84   Ht 1.651 m (5' 5\")   Wt 81.6 kg (180 lb)   LMP 2019   SpO2 99%   Breastfeeding? No   BMI 29.95 kg/m   Estimated body mass index is 29.95 kg/m  as calculated from the following:    Height as of this encounter: 1.651 m (5' 5\").    Weight as of this encounter: 81.6 kg (180 lb).  BP completed using cuff size: regular    Questioned patient about current smoking habits.  Pt. has never smoked.          The following HM Due: NONE      The following patient reported/Care Every where data was sent to:  P ABSTRACT QUALITY INITIATIVES [[id number]]  n/a      patient has appointment for today              "

## 2019-06-23 NOTE — PROGRESS NOTES
"S:  Ruthie presents for f/u menstrual migraines.   Nexplanon placed 2018.   IUD removed 2018.   Initially had no headaches after nexplanon placement. Then after Mirena removed had a very prolonged headache.  She started norethindrone OCPs on her own because she had some left and headache resolved. Wondering if she can continue norethindrone.     O:  Vitals:    19 0918   BP: 114/80   Pulse: 84   SpO2: 99%   Weight: 81.6 kg (180 lb)   Height: 1.651 m (5' 5\")     Gen: well appearing  Psych: affect bright.     A/P:  44 year old  with menstrual migraines  Discussed unclear biologic plausibility of return of headaches after Mirena removal, which she agrees with. Also low risk of continuing norethindrone.   Discussed that it is probably unlikely that she will be completely headache free, given her history. Goal is to minimize headaches.  Ok to continue norethindrone for now. Encourage trying to go off of it to see what headache frequency is like. Can refer to headache clinic at the  as next step.  Encouraged compliance with propanolol.    Greater than 50% of this 20 minute appointment was spent in counseling on menstrual migraines.    "

## 2019-07-02 ENCOUNTER — TELEPHONE (OUTPATIENT)
Dept: CARDIOLOGY | Facility: CLINIC | Age: 44
End: 2019-07-02

## 2019-07-03 ENCOUNTER — TELEPHONE (OUTPATIENT)
Dept: CARDIOLOGY | Facility: CLINIC | Age: 44
End: 2019-07-03

## 2019-07-05 ENCOUNTER — TELEPHONE (OUTPATIENT)
Dept: CARDIOLOGY | Facility: CLINIC | Age: 44
End: 2019-07-05

## 2019-08-06 ENCOUNTER — ANCILLARY PROCEDURE (OUTPATIENT)
Dept: MRI IMAGING | Facility: CLINIC | Age: 44
End: 2019-08-06
Attending: FAMILY MEDICINE
Payer: COMMERCIAL

## 2019-08-06 DIAGNOSIS — M54.2 NECK PAIN: ICD-10-CM

## 2019-08-07 ENCOUNTER — MYC MEDICAL ADVICE (OUTPATIENT)
Dept: FAMILY MEDICINE | Facility: CLINIC | Age: 44
End: 2019-08-07

## 2019-08-07 ENCOUNTER — OFFICE VISIT (OUTPATIENT)
Dept: OBGYN | Facility: CLINIC | Age: 44
End: 2019-08-07
Payer: COMMERCIAL

## 2019-08-07 VITALS — DIASTOLIC BLOOD PRESSURE: 91 MMHG | HEART RATE: 91 BPM | SYSTOLIC BLOOD PRESSURE: 134 MMHG

## 2019-08-07 DIAGNOSIS — Z30.46 NEXPLANON REMOVAL: Primary | ICD-10-CM

## 2019-08-07 PROCEDURE — 11982 REMOVE DRUG IMPLANT DEVICE: CPT | Performed by: OBSTETRICS & GYNECOLOGY

## 2019-08-07 NOTE — PROGRESS NOTES
S; Ruthie Nash is a 44 year old  who is here for nexplanon removal.  She had it placed about 9 months ago and it has worked well for her in terms of contraception and control of HA.  She reports that about a month after insertion she developed back pain.  Since then she has gone to PT, has been trying to lose weight and exercising, but she continues to have back pain.  She read this is a side effect of nexplanon and would like it removed.  She is unsure about contraception long term, but will use condoms for now.    O; BP (!) 134/91   Pulse 91     Gen: NAD    Procedure: Nexplanon removal  PARQ was held and consents signed. Patient was placed in dorsal supine position with left arm abducted and externally rotated. Nexplanon was palpated under skin. The area was cleansed with betadine. The distal site was injected with 1 ml of 1% plain lidocaine.  While pushing down on the proximal end, 2 mm incision was made over the distal implant with an 11 blade scalpel. The implant was grasped with a mosquito forceps and removed intact. The skin was closed with dermabond. A pressure bandage was placed for the next 6 hours. The patient tolerated the procedure well.     A/P: successful nexplanon removal   Encouraged condom compliance.  Ok to RTC if pain not relieved after removal if she desires re placement.  Questions answered.    SUSANA COLLINS MD

## 2019-08-13 DIAGNOSIS — L08.89 PITTED KERATOLYSIS: ICD-10-CM

## 2019-08-13 RX ORDER — CLINDAMYCIN PHOSPHATE 10 MG/G
GEL TOPICAL
Qty: 60 G | Refills: 1 | Status: SHIPPED | OUTPATIENT
Start: 2019-08-13 | End: 2020-06-18

## 2019-08-13 NOTE — TELEPHONE ENCOUNTER
"Requested Prescriptions   Pending Prescriptions Disp Refills     clindamycin (CLINDAGEL) 1 % external gel 60 g 1     Sig: Apply small amount to soles of feet nightly  Last Written Prescription Date:  6/25/2018  Last Fill Quantity: 60g,  # refills: 1   Last Office Visit: 1/29/2019   Future Office Visit:            Topical Acne Medications Protocol Passed - 8/13/2019  2:32 PM        Passed - Patient is 12 years of age or older        Passed - Recent (12 mo) or future (30 days) visit within the authorizing provider's specialty     Patient had office visit in the last 12 months or has a visit in the next 30 days with authorizing provider or within the authorizing provider's specialty.  See \"Patient Info\" tab in inbasket, or \"Choose Columns\" in Meds & Orders section of the refill encounter.            Passed - Medication is active on med list          "

## 2019-08-14 NOTE — TELEPHONE ENCOUNTER
Signed Prescriptions:                        Disp   Refills    clindamycin (CLINDAGEL) 1 % external gel   60 g   1        Sig: Apply small amount to soles of feet nightly  Authorizing Provider: PAULA WELSH  Ordering User: RONALD LAI

## 2019-09-26 ENCOUNTER — ALLIED HEALTH/NURSE VISIT (OUTPATIENT)
Dept: NURSING | Facility: CLINIC | Age: 44
End: 2019-09-26
Payer: COMMERCIAL

## 2019-09-26 DIAGNOSIS — Z23 NEED FOR PROPHYLACTIC VACCINATION AND INOCULATION AGAINST INFLUENZA: Primary | ICD-10-CM

## 2019-09-26 PROCEDURE — 90471 IMMUNIZATION ADMIN: CPT

## 2019-09-26 PROCEDURE — 99207 ZZC NO CHARGE NURSE ONLY: CPT

## 2019-09-26 PROCEDURE — 90686 IIV4 VACC NO PRSV 0.5 ML IM: CPT

## 2019-09-26 NOTE — NURSING NOTE

## 2019-10-02 DIAGNOSIS — Z84.81 FAMILY HISTORY OF GENE MUTATION: Primary | ICD-10-CM

## 2019-10-02 DIAGNOSIS — Z82.49 FAMILY HISTORY OF HYPERTROPHIC CARDIOMYOPATHY: ICD-10-CM

## 2019-10-04 ENCOUNTER — OFFICE VISIT (OUTPATIENT)
Dept: CARDIOLOGY | Facility: CLINIC | Age: 44
End: 2019-10-04
Attending: GENETIC COUNSELOR, MS
Payer: COMMERCIAL

## 2019-10-04 DIAGNOSIS — Z82.49 FAMILY HISTORY OF HYPERTROPHIC CARDIOMYOPATHY: ICD-10-CM

## 2019-10-04 DIAGNOSIS — Z84.81 FAMILY HISTORY OF GENE MUTATION: Primary | ICD-10-CM

## 2019-10-04 DIAGNOSIS — Z84.81 FAMILY HISTORY OF GENE MUTATION: ICD-10-CM

## 2019-10-04 LAB — MISCELLANEOUS TEST: NORMAL

## 2019-10-04 PROCEDURE — 36415 COLL VENOUS BLD VENIPUNCTURE: CPT | Performed by: GENETIC COUNSELOR, MS

## 2019-10-04 PROCEDURE — 96040 ZZH GENETIC COUNSELING, EACH 30 MINUTES: CPT | Mod: ZF | Performed by: GENETIC COUNSELOR, MS

## 2019-10-04 NOTE — LETTER
10/4/2019      RE: Ruthie Nash  [address]  [facility] [address]       Dear Colleague,    Thank you for the opportunity to participate in the care of your patient, Ruthie Nash, at the [facility]. Please see a copy of my visit note below.    Here is a copy of the progress note from your recent genetic counseling visit to [facility] on Date: 10/4/2019.    PROGRESS NOTE: Ruthie was seen for genetic counseling due to the family history of hypertrophic cardiomyopathy (HCM).  I had the opportunity to meet with Ruthie and her son today to discuss the genetic component of HCM and testing options available to her.     MEDICAL HISTORY: Ruthie reports that she is in good health.  At this time she has not had any history of shortness of breath, fainting, lightheadedness, or dizziness. She has not had an clinical cardiac screening.    FAMILY HISTORY: A detailed family history was obtained at her mother's office visit on 2018.  (Please see scanned pedigree for details).  Family history was significant for the following: Ruthie's mother Raiaz's maternal aunt  suddenly at 45 years of age.  She was known to have HCM but was not being treated. More recently, a male cousin of Maile from a different maternal aunt had a sudden cardiac arrest while running.  He was then diagnosed with HCM and pursued genetic testing.  Testing revealed that he has a mutation in the MYBPC3 gene.  Since finding this out, several of Petes other cousins and a maternal uncle have been found to carry the MYBPC3 gene mutation.      Raiza mother  at 70 years from a heart attack.  She had a triple bypass surgery about 10 years prior to her death. Raiza's maternal grandfather  in his 70's from a stroke.  He was known to have coronary artery disease.  Raiza's maternal grandmother  at 89 years with dementia.  The family believes the  HCM may have come from her side but no details were provided and grandmother was not known to have any heart problems.      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 Her clinical screening is also consistent with HCM.    Ruthie's three children are all in good health.There is no additional history of cardiomyopathy, arrythmias, heart attacks, fainting, sudden cardiac death, genetic conditions, or birth defects.     DISCUSSION:  Reviewed diagnosis of HCM. Explained that MYBPC3 gene mutations are one of the most common mutations found in association with HCM.    Mutations in the MYBPC3 gene are inherited in an autosomal dominant (AD) pattern. Reviewed AD inheritance. Explained that most AD cardiac mutations are inherited from a parent, therefore it is appropriate to offer genetic testing in parents,siblings, and children.  Each of those family members has a 50% risk of carrying the same gene. Explained variable expressivity and reduced penetrance which can help explain why a condition can be genetic even when there is no apparent family history.     Ruthie understands that if she is found to carry a mutation, each of her children will have a 50% risk of carrying the mutation as well. If Ruthie does NOT carry the familial mutation, her children are NOT at risk for developing this form of cardiomyopathy.   Reviewed logistics of testing including: Turn around time for results of 2-4 weeks. Reviewed cost of testing thru commercial lab.  Explained that they will work with insurance carrier and notify patient if out of pocket costs exceed $100.   Discussed pros and cons of genetic testing. Explained that results could significantly impact management and treatment decisions.  Reviewed possible issues associated with presymptomatic testing including genetic discrimination, current laws to prevent discrimination (ie. HAY), insurance issues, and emotional and psychosocial outcomes of testing. Ruthie may pursue  additional life insurance.  She will notify me if she would like me to hold test results until insurance is in place.    Recommend clinical evaluation for all first degree relatives (parents, siblings, and children) of an affected individual regardless of decision to pursue genetic testing. Based on the Heart Failure Society of [place] Practice Guidelines (Jay et al, 2009), clinical evaluation should be performed at least every 3 years, except yearly during puberty, and should include history, cardiac exam, ECHO, EKG, Holter monitoring, and exercise treadmill.    PLAN:Ruthie elected to proceed with genetic testing.  Requisition and consent forms were completed and signed.  Blood was drawn and sample was sent to laboratory. I will contact patient when results are available.    TOTAL TIME SPENT IN COUNSELIN minutes    Nessa Jacobo MS  Licensed Genetic Counselor  [facility]

## 2019-10-04 NOTE — PROGRESS NOTES
Here is a copy of the progress note from your recent genetic counseling visit to the [facility] on Date: 10/4/2019.    PROGRESS NOTE: Ruthie was seen for genetic counseling due to the family history of hypertrophic cardiomyopathy (HCM).  I had the opportunity to meet with Ruthie and her son today to discuss the genetic component of HCM and testing options available to her.     MEDICAL HISTORY: Ruthie reports that she is in good health.  At this time she has not had any history of shortness of breath, fainting, lightheadedness, or dizziness. She has not had an clinical cardiac screening.    FAMILY HISTORY: A detailed family history was obtained at her mother's office visit on 2018.  (Please see scanned pedigree for details).  Family history was significant for the following: Ruthie's mother Raiza's maternal aunt  suddenly at 45 years of age.  She was known to have HCM but was not being treated. More recently, a male cousin of Maile from a different maternal aunt had a sudden cardiac arrest while running.  He was then diagnosed with HCM and pursued genetic testing.  Testing revealed that he has a mutation in the MYBPC3 gene.  Since finding this out, several of Raiza's other cousins and a maternal uncle have been found to carry the MYBPC3 gene mutation.      Raiza mother  at 70 years from a heart attack.  She had a triple bypass surgery about 10 years prior to her death. Raiza's maternal grandfather  in his 70's from a stroke.  He was known to have coronary artery disease.  Raiza's maternal grandmother  at 89 years with dementia.  The family believes the HCM may have come from her side but no details were provided and grandmother was not known to have any heart problems.      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 Her clinical screening is also consistent with HCM.    Ruthie's three children are all in good health.There is no  additional history of cardiomyopathy, arrythmias, heart attacks, fainting, sudden cardiac death, genetic conditions, or birth defects.     DISCUSSION:  Reviewed diagnosis of HCM. Explained that MYBPC3 gene mutations are one of the most common mutations found in association with HCM.    Mutations in the MYBPC3 gene are inherited in an autosomal dominant (AD) pattern. Reviewed AD inheritance. Explained that most AD cardiac mutations are inherited from a parent, therefore it is appropriate to offer genetic testing in parents,siblings, and children.  Each of those family members has a 50% risk of carrying the same gene. Explained variable expressivity and reduced penetrance which can help explain why a condition can be genetic even when there is no apparent family history.     Ruthie understands that if she is found to carry a mutation, each of her children will have a 50% risk of carrying the mutation as well. If Ruthie does NOT carry the familial mutation, her children are NOT at risk for developing this form of cardiomyopathy.   Reviewed logistics of testing including: Turn around time for results of 2-4 weeks. Reviewed cost of testing thru commercial lab.  Explained that they will work with insurance carrier and notify patient if out of pocket costs exceed $100.   Discussed pros and cons of genetic testing. Explained that results could significantly impact management and treatment decisions.  Reviewed possible issues associated with presymptomatic testing including genetic discrimination, current laws to prevent discrimination (ie. HAY), insurance issues, and emotional and psychosocial outcomes of testing. Ruthie may pursue additional life insurance.  She will notify me if she would like me to hold test results until insurance is in place.    Recommend clinical evaluation for all first degree relatives (parents, siblings, and children) of an affected individual regardless of decision to pursue genetic testing. Based on the  Heart Failure Society of [place] Practice Guidelines (Jay et al, 2009), clinical evaluation should be performed at least every 3 years, except yearly during puberty, and should include history, cardiac exam, ECHO, EKG, Holter monitoring, and exercise treadmill.    PLAN:Ruthie elected to proceed with genetic testing.  Requisition and consent forms were completed and signed.  Blood was drawn and sample was sent to laboratory. I will contact patient when results are available.    TOTAL TIME SPENT IN COUNSELIN minutes    Nessa Jacobo MS  Licensed Genetic Counselor  [facility]

## 2019-10-16 LAB — LAB SCANNED RESULT: NORMAL

## 2019-11-06 ENCOUNTER — TELEPHONE (OUTPATIENT)
Dept: CARDIOLOGY | Facility: CLINIC | Age: 44
End: 2019-11-06

## 2019-11-06 NOTE — TELEPHONE ENCOUNTER
Spoke with Ruthie today to review results of genetic testing. She underwent genetic testing on October 4, 2019 due to the family history of hypertrophic cardiomyopathy (HCM) and a known genetic mutation in her mother and her mother's cousin.  Genetic testing for the familial mutation was sent to [facility] and revealed that Ruthie does NOT carry a mutation in the MYBPC3 gene. This result significantly reduces, but does not eliminate, her risk to develop cardiomyopathy.  Based on these results, clinical screening is not recommended for Ruthie at this time. However, she should seek care if she has any symptoms associated with HCM or other heart problems.   Since Ruthie does not carry the familial mutation, she CANNOT pass this mutation on to her children.  A summary letter and copy of the results will be sent to patient. All questions answered at this time.   Nessa Jacobo MS  Licensed Genetic Counselor  [facility]  [facility]

## 2019-11-09 ENCOUNTER — HEALTH MAINTENANCE LETTER (OUTPATIENT)
Age: 44
End: 2019-11-09

## 2019-12-23 NOTE — TELEPHONE ENCOUNTER
RECORDS RECEIVED FROM: Internal   Date of Appt: 3.2.2020   NOTES (FOR ALL VISITS) STATUS DETAILS   OFFICE NOTE from referring provider N/A    OFFICE NOTE from other specialist Internal 6.21.19 Dr. Skaggs, [facility]   DISCHARGE SUMMARY from hospital N/A    DISCHARGE REPORT from the ER N/A    OPERATIVE REPORT N/A    MEDICATION LIST Internal    IMAGING  (FOR ALL VISITS)     EMG N/A    EEG N/A    MRI (HEAD, NECK, SPINE) Internal 8.6.19 MR cervical spine   LUMBAR PUNCTURE N/A    SALINA Scan N/A    CT (HEAD, NECK, SPINE) N/A

## 2020-03-02 ENCOUNTER — PRE VISIT (OUTPATIENT)
Dept: NEUROLOGY | Facility: CLINIC | Age: 45
End: 2020-03-02

## 2020-03-06 DIAGNOSIS — G43.011 INTRACTABLE MIGRAINE WITHOUT AURA AND WITH STATUS MIGRAINOSUS: ICD-10-CM

## 2020-03-06 RX ORDER — PROPRANOLOL HYDROCHLORIDE 20 MG/1
20 TABLET ORAL 2 TIMES DAILY
Qty: 180 TABLET | Refills: 0 | Status: SHIPPED | OUTPATIENT
Start: 2020-03-06 | End: 2021-08-04

## 2020-03-06 NOTE — TELEPHONE ENCOUNTER
Last OV was 6/21/19. Pt will be due for AE. Are you okay signing this prescription? If so, please sign. Thanks.   Cherise Leblanc RN-BSN

## 2020-05-06 ENCOUNTER — VIRTUAL VISIT (OUTPATIENT)
Dept: NEUROLOGY | Facility: CLINIC | Age: 45
End: 2020-05-06
Payer: COMMERCIAL

## 2020-05-06 DIAGNOSIS — G43.909 MIGRAINE WITHOUT STATUS MIGRAINOSUS, NOT INTRACTABLE, UNSPECIFIED MIGRAINE TYPE: Primary | ICD-10-CM

## 2020-05-06 RX ORDER — PROPRANOLOL HYDROCHLORIDE 80 MG/1
160 CAPSULE, EXTENDED RELEASE ORAL EVERY EVENING
Qty: 60 CAPSULE | Refills: 3 | Status: SHIPPED | OUTPATIENT
Start: 2020-05-06 | End: 2020-08-06

## 2020-05-06 RX ORDER — RIZATRIPTAN BENZOATE 10 MG/1
10 TABLET ORAL
Qty: 18 TABLET | Refills: 3 | Status: SHIPPED | OUTPATIENT
Start: 2020-05-06 | End: 2020-08-06

## 2020-05-06 NOTE — PROGRESS NOTES
"Ruthie Nash is a 45 year old female who is being evaluated via a billable video visit.      The patient has been notified of following:     \"This video visit will be conducted via a call between you and your physician/provider. We have found that certain health care needs can be provided without the need for an in-person physical exam.  This service lets us provide the care you need with a video conversation.  If a prescription is necessary we can send it directly to your pharmacy.  If lab work is needed we can place an order for that and you can then stop by our lab to have the test done at a later time.    Video visits are billed at different rates depending on your insurance coverage.  Please reach out to your insurance provider with any questions.    If during the course of the call the physician/provider feels a video visit is not appropriate, you will not be charged for this service.\"    Patient has given verbal consent for Video visit? Yes    Patient would like the video invitation sent by: [email or web address]     Video-Visit Details    Type of service:  Video Visit    Video Start Time: 7:45 AM  Video End Time: 8:23 AM    Originating Location (pt. Location): Home    Distant Location (provider location):  [facility]     Platform used for Video Visit: Artify It not working, switched to [email or web address]    MARILYN Guajardo    [facility]   Neurology Consult     Ruthie Nash MRN# [id number]   YOB: 1975 Age: 45 year old     Requesting physician: self-referred         Assessment and Recommendations:     Ruthie Nash is a 45 year old female who presents for further evaluation of headaches.    Her headache presentation is consistent with episodic migraine with rare visual aura.  This has worsened since the birth of her last child.  Her virtual neurologic examination is intact today.  There is a family history of headache in her siblings, and I suspect that she has migraine " on a genetic basis.    I did not recommend any further work-up for secondary causes of headache at this time.    Going forward, we discussed a symptomatic treatment strategy, focusing on both acute and preventative treatment options.  -For acute treatment of mild headache, she will continue ibuprofen as needed, not to exceed more than 14 days/month to avoid medication overuse.  - For acute treatment of moderate to severe headache, I recommend rizatriptan 10 mg tablet taken at the onset of headache, with a repeat dose in 2 hours if needed.  This should not exceed more than 9 days/month to avoid medication overuse.    For headache prevention, I recommended she increase her dose of propranolol to 80 mg nightly.  For convenience, we will switch this to a long-acting form, so that she may continue to take it at night only.  After 1 week, if she is tolerating it, she will increase to 160 mg nightly.    I spent 38 minutes with the patient, over half of which was counseling.  I will plan to see her back in 3 months to monitor her progress.  She will contact me in the meantime if she has any questions or concerns.    Evi Goodson MD  Neurology  Pager: [phone]            Chief Complaint:     Chief Complaint   Patient presents with     Headache     video visit new       History is obtained from the patient and medical record.  Patient was seen via a virtual visit in their home due to the Covid 19 global pandemic.      Ruthie Nash is a 45 year old female who has been living with headaches since high school. They worsened after the birth of her last child three years ago. They were thought to be hormonal, and she tried a hormonal implant. She has continued to have headaches.    She has been keeping track of her headaches since September on a calendar. She has recorded 10 headache days in September, 4 headache days in October, 14 headache days in November, 8 headache days in December, 3 headache days in January, 5 headache  days in February, 21 headache days in March, and 10 headache days in April. A headache attack can last up to 5 days.    They occur over the front and back of her head, including around her eyes and in her jaw. She describes a dull pain associated with a sense of unwellness, fatigue, fogginess. This is associated with photophobia, phonophobia, osmophobia 20% of the time. She can get stomach upset and mild nausea, no vomiting. About twice per year, she gets a visual aura with blurriness before a headache attack.     She prefers to lay down in a dark, quiet place, if possible. She generally pushes through.    Not positional. No unilateral autonomic features. No fevers or other illness.    She has tried allergy medication without relief. She takes Ibuprofen and drinks water. This is effective about half of the time.    In the distant past, she tried a few migraine medications, which were somewhat helpful.    She takes propranolol 20 mg nightly for headache prevention, but has not noticed any difference so far.            Past Medical History:     Past Medical History:   Diagnosis Date     Body aches      Chronic headache      LENORE (generalised anxiety disorder) 6/23/2015     Headache      Insomnia 12/10/2008     LSIL (low grade squamous intraepithelial lesion) on Pap smear 2007    also +HPV, colps WNL x two              Past Surgical History:   Right arm surgery - orthopedic          Social History:   Has a  and three children. She currently works part time at the [employer]. She does mental health CBT with .     No smoking, alcohol socially, no drug use.  Caffeine - 1-2 cups in morning, sometimes third          Family History:   There is a family history of headaches in her siblings.  Family History   Problem Relation Age of Onset     Allergies Mother      Hypertension Mother      Mental Illness Mother      Hypertension Father      Mental Illness Father      Family History Negative Paternal Grandmother      Heart  Disease Maternal Grandmother      Obesity Maternal Grandmother      Diabetes Maternal Grandmother      Cancer Maternal Grandfather      Heart Disease Paternal Grandfather      Obesity Paternal Grandfather      Diabetes Paternal Grandfather      Family History Negative Sister      Family History Negative Brother      Family History Negative Brother              Allergies:      Allergies   Allergen Reactions     Birch Trees      Dust Mites      Hay Fever & [A.R.M.]      Mold              Medications:     Current Outpatient Medications:      Acetaminophen (TYLENOL PO), Take 325-650 mg by mouth every 4 hours as needed for mild pain or fever, Disp: , Rfl:      aluminum chloride (DRYSOL) 20 % external solution, Apply topically At Bedtime Apply to dry skin at bedtime once daily, wash off in morning., Disp: 35 mL, Rfl: 1     clindamycin (CLINDAGEL) 1 % external gel, Apply small amount to soles of feet nightly, Disp: 60 g, Rfl: 1     norethindrone (MICRONOR) 0.35 MG tablet, Take 1 tablet (0.35 mg) by mouth daily, Disp: 84 tablet, Rfl: 3     Omega-3 Fatty Acids (FISH OIL PO), , Disp: , Rfl:      Prenatal Multivit-Min-Fe-FA (PRENATAL VITAMINS PO), , Disp: , Rfl:      propranolol (INDERAL) 20 MG tablet, Take 1 tablet (20 mg) by mouth 2 times daily, Disp: 180 tablet, Rfl: 0     tiZANidine (ZANAFLEX) 2 MG tablet, Take 1-2 tablets (2-4 mg) by mouth 3 times daily as needed for muscle spasms, Disp: 30 tablet, Rfl: 0     VITAMIN D, CHOLECALCIFEROL, PO, Take by mouth daily, Disp: , Rfl:      etonogestrel (IMPLANON) 68 MG IMPL, 1 each (68 mg) by Subdermal route once for 1 dose, Disp: 1 each, Rfl: 0          Review of Systems:   Complete review of systems is positive for back pain, overweight, and as noted in the HPI.          Physical Exam:   There were no vitals taken for this visit.   Physical Exam:   General: NAD  Neurologic:   Mental Status Exam: Alert, awake and oriented to situation. No dysarthria. Speech of normal  fluency.   Cranial Nerves: Pupils equal, EOMs intact, no nystagmus, facial movements symmetric, hearing intact to conversation, tongue midline and fully mobile. No tongue atrophy or fasciculations.    Motor: No drift in upper extremities. Able to stand from a seated position without use of arms. No tremors or abnormal movements noted.   Coordination: With arms outstretched, able to touch nose using index finger accurately bilaterally.  Normal finger tapping bilaterally.     Gait: Normal stance.  Neck: Normal range of motion with lateral head movements and neck flexion.  Eyes: No conjunctival injection, no scleral icterus.           Data:     MRI cervical spine (8/6/2019):  Mild degenerative disc findings, with mild right neural  foraminal narrowing at C4-5 and C6-7. Mild spinal canal narrowing at  C6-7.    .

## 2020-06-07 ENCOUNTER — MYC MEDICAL ADVICE (OUTPATIENT)
Dept: FAMILY MEDICINE | Facility: CLINIC | Age: 45
End: 2020-06-07

## 2020-06-16 ENCOUNTER — MYC REFILL (OUTPATIENT)
Dept: FAMILY MEDICINE | Facility: CLINIC | Age: 45
End: 2020-06-16

## 2020-06-16 DIAGNOSIS — L08.89 PITTED KERATOLYSIS: ICD-10-CM

## 2020-06-16 RX ORDER — CLINDAMYCIN PHOSPHATE 10 MG/G
GEL TOPICAL
Qty: 60 G | Refills: 1 | Status: CANCELLED | OUTPATIENT
Start: 2020-06-16

## 2020-06-17 ENCOUNTER — MYC MEDICAL ADVICE (OUTPATIENT)
Dept: FAMILY MEDICINE | Facility: CLINIC | Age: 45
End: 2020-06-17

## 2020-06-18 ENCOUNTER — E-VISIT (OUTPATIENT)
Dept: FAMILY MEDICINE | Facility: CLINIC | Age: 45
End: 2020-06-18
Payer: COMMERCIAL

## 2020-06-18 DIAGNOSIS — L08.89 PITTED KERATOLYSIS: ICD-10-CM

## 2020-06-18 PROCEDURE — 99421 OL DIG E/M SVC 5-10 MIN: CPT | Performed by: FAMILY MEDICINE

## 2020-06-18 RX ORDER — CLINDAMYCIN PHOSPHATE 10 MG/G
GEL TOPICAL
Qty: 60 G | Refills: 1 | Status: SHIPPED | OUTPATIENT
Start: 2020-06-18 | End: 2020-11-08

## 2020-06-18 RX ORDER — ALUMINUM CHLORIDE 20 %
SOLUTION, NON-ORAL TOPICAL AT BEDTIME
Qty: 35 ML | Refills: 1 | Status: SHIPPED | OUTPATIENT
Start: 2020-06-18 | End: 2021-01-21

## 2020-06-18 NOTE — PATIENT INSTRUCTIONS
Thank you for choosing us for your care. I have placed an order for a prescription so that you can start treatment. View your full visit summary for details by clicking on the link below. Your pharmacist will able to address any questions you may have about the medication.     If you're not feeling better within 5-7 days, please schedule an appointment.  You can schedule an appointment right here in [facility], or call [phone]  If the visit is for the same symptoms as your e-visit, we'll refund the cost of your e-visit if seen within seven days.

## 2020-07-16 ENCOUNTER — VIRTUAL VISIT (OUTPATIENT)
Dept: URGENT CARE | Facility: CLINIC | Age: 45
End: 2020-07-16
Payer: COMMERCIAL

## 2020-07-16 ENCOUNTER — NURSE TRIAGE (OUTPATIENT)
Dept: NURSING | Facility: CLINIC | Age: 45
End: 2020-07-16

## 2020-07-16 DIAGNOSIS — Z20.822 EXPOSURE TO COVID-19 VIRUS: Primary | ICD-10-CM

## 2020-07-16 DIAGNOSIS — R50.9 FEVER AND CHILLS: ICD-10-CM

## 2020-07-16 PROCEDURE — 99213 OFFICE O/P EST LOW 20 MIN: CPT | Mod: 95 | Performed by: INTERNAL MEDICINE

## 2020-07-16 NOTE — TELEPHONE ENCOUNTER
"Patient reports her 3 children are potentially symptomatic of Covid19.  Children's symptoms include fever, chills, sore throats, headache, fatigue, body aches.    Pt herself has no symptoms.  Children's charts have been triaged and warm transferred to a  for virtual telephone visit appointments, to request orders for Covid19 PCR Swab testing.  (Mother states \"tried [email or web address] however website would not allow continuation for each minor child, therefore calling instead\".)    Patient herself would like telephone provider visit appointment to discuss advisability of covid testing due to close proximity with her children who do have symptoms.  Warm transferred to a  for an appointment now.    Wendi OLIVA Health Nurse Advisor     Reason for Disposition    [1] COVID-19 EXPOSURE (Close Contact) AND [2] within last 14 days BUT [3] NO symptoms    Additional Information    Negative: COVID-19 has been diagnosed by a healthcare provider (HCP)    Negative: COVID-19 lab test positive    Negative: [1] Symptoms of COVID-19 (e.g., cough, fever, SOB, or others) AND [2] lives in an area with community spread    Negative: [1] Symptoms of COVID-19 (e.g., cough, fever, SOB, or others) AND [2] within 14 days of EXPOSURE (close contact) with diagnosed or suspected COVID-19 patient    Negative: [1] Symptoms of COVID-19 (e.g., cough, fever, SOB, or others) AND [2] within 14 days of travel from high-risk area for COVID-19 community spread (identified by CDC)    Negative: [1] Difficulty breathing (shortness of breath) occurs AND [2] onset > 14 days after COVID-19 EXPOSURE (Close Contact) AND [3] no community spread where patient lives    Negative: [1] Dry cough occurs AND [2] onset > 14 days after COVID-19 EXPOSURE AND [3] no community spread where patient lives    Negative: [1] Wet cough (i.e., white-yellow, yellow, green, or roopa colored sputum) AND [2] onset > 14 days after COVID-19 EXPOSURE AND [3] no community spread " where patient lives    Negative: [1] Common cold symptoms AND [2] onset > 14 days after COVID-19 EXPOSURE AND [3] no community spread where patient lives    Negative: [1] COVID-19 EXPOSURE (Close Contact) within last 14 days AND [2] needs COVID-19 lab test to return to work AND [3] NO symptoms    Negative: [1] COVID-19 EXPOSURE (Close Contact) within last 14 days AND [2] exposed person is a healthcare worker who was NOT using all recommended personal protective equipment (i.e., a respirator-N95 mask, eye protection, gloves, and gown) AND [3] NO symptoms    [facility] Specific Disposition  - [facility] Specific Patient Instructions  COVID 19 Nurse Triage Plan/Patient Instructions    Please be aware that novel coronavirus (COVID-19) may be circulating in the community. If you develop symptoms such as fever, cough, or SOB or if you have concerns about the presence of another infection including coronavirus (COVID-19), please contact your health care provider or visit [email or web address].       Virtual Visit with provider recommended. Reference Visit Selection Guide.    Thank you for taking steps to prevent the spread of this virus.  Limit your contact with others.  Wear a simple mask to cover your cough.  Wash your hands well and often.    Resources  [facility]: About COVID-19: [email or web address]  CDC: What to Do If You're Sick: www.cdc.gov/coronavirus/2019-ncov/about/steps-when-sick.html  CDC: Ending Home Isolation: www.cdc.gov/coronavirus/2019-ncov/hcp/disposition-in-home-patients.html   CDC: Caring for Someone: www.cdc.gov/coronavirus/2019-ncov/if-you-are-sick/care-for-someone.html   [facility]: Interim Guidance for Hospital Discharge to Home: www.health.[facility].[place]/diseases/coronavirus/hcp/hospdischarge.pdf  [facility] clinical trials (COVID-19 research studies): clinicalaffairs.[facility]/umn-clinical-trials   Below are the COVID-19 hotlines at the [place] Department of Health  ([facility]). Interpreters are available.   For health questions: Call 963-186-5339 or 1-528.868.6640 (7 a.m. to 7 p.m.)  For questions about schools and childcare: Call 056-719-8466 or 1-477.332.3662 (7 a.m. to 7 p.m.)     Virtual visit is per patient's specific request.    Protocols used: CORONAVIRUS (COVID-19) EXPOSURE-A- 5.16.20

## 2020-07-16 NOTE — PROGRESS NOTES
"  Ruthie Nash is a 45 year old female who is being evaluated via a billable telephone visit.      The patient has been notified of following:     \"This telephone visit will be conducted via a call between you and your physician/provider. We have found that certain health care needs can be provided without the need for a physical exam.  This service lets us provide the care you need with a short phone conversation.  If a prescription is necessary we can send it directly to your pharmacy.  If lab work is needed we can place an order for that and you can then stop by our lab to have the test done at a later time.    If during the course of the call the physician/provider feels a telephone visit is not appropriate, you will not be charged for this service.\"     Patient has given verbal consent for Telephone visit?  Yes    SUBJECTIVE:  Ruthie Nash is an 45 year old female who presents for concern about exposure to covid and possibly having covid in past.  Her children have symptoms c/w covid at this time and are getting tested.  She currently does not have symptoms.  She also thinks she may have had covid in March as she had fevers, headache, sore throat, and body aches at that time.  She would like a covid antibody test to be done.  Currently no symptoms. No fevers, cough, body aches.  No recent intl travel.  Has had exposure to someone with covid.    PMH:   has a past medical history of Body aches, Chronic headache, LENORE (generalised anxiety disorder) (6/23/2015), Headache, Insomnia (12/10/2008), and LSIL (low grade squamous intraepithelial lesion) on Pap smear (2007).  Patient Active Problem List   Diagnosis     Insomnia     Atopic rhinitis     Adjustment disorder with anxiety     CARDIOVASCULAR SCREENING; LDL GOAL LESS THAN 160     Hyperhidrosis of feet     Pitted keratolysis     Skin tag     Pain in joint, upper arm     Aftercare for healing traumatic fracture of upper arm     Other postprocedural status(V45.89) "     Low back pain     Lumbago     Pain in joint, pelvic region and thigh     Moderate major depression (H)     Body aches     Vitamin D deficiency     Headache     Generalized anxiety disorder     Laryngitis     Cervicalgia     Migraine without aura and without status migrainosus, not intractable     Chronic intractable headache, unspecified headache type     Excessive or frequent menstruation     Trapezius muscle spasm     Social History     Socioeconomic History     Marital status: Single     Spouse name: Not on file     Number of children: Not on file     Years of education: Not on file     Highest education level: Not on file   Occupational History     Not on file   Social Needs     Financial resource strain: Not on file     Food insecurity     Worry: Not on file     Inability: Not on file     Transportation needs     Medical: Not on file     Non-medical: Not on file   Tobacco Use     Smoking status: Never Smoker     Smokeless tobacco: Never Used   Substance and Sexual Activity     Alcohol use: Yes     Comment: socially     Drug use: No     Sexual activity: Yes     Partners: Male     Birth control/protection: Pill, Implant   Lifestyle     Physical activity     Days per week: Not on file     Minutes per session: Not on file     Stress: Not on file   Relationships     Social connections     Talks on phone: Not on file     Gets together: Not on file     Attends Scientologist service: Not on file     Active member of club or organization: Not on file     Attends meetings of clubs or organizations: Not on file     Relationship status: Not on file     Intimate partner violence     Fear of current or ex partner: Not on file     Emotionally abused: Not on file     Physically abused: Not on file     Forced sexual activity: Not on file   Other Topics Concern     Parent/sibling w/ CABG, MI or angioplasty before 65F 55M? No   Social History Narrative     Not on file     Family History   Problem Relation Age of Onset      Allergies Mother      Hypertension Mother      Mental Illness Mother      Hypertension Father      Mental Illness Father      Family History Negative Paternal Grandmother      Heart Disease Maternal Grandmother      Obesity Maternal Grandmother      Diabetes Maternal Grandmother      Cancer Maternal Grandfather      Heart Disease Paternal Grandfather      Obesity Paternal Grandfather      Diabetes Paternal Grandfather      Family History Negative Sister      Family History Negative Brother      Family History Negative Brother        ALLERGIES:  Birch trees; Dust mites; Hay fever & [a.r.m.]; and Mold    Current Outpatient Medications   Medication     Acetaminophen (TYLENOL PO)     aluminum chloride (DRYSOL) 20 % external solution     clindamycin (CLINDAGEL) 1 % external gel     etonogestrel (IMPLANON) 68 MG IMPL     norethindrone (MICRONOR) 0.35 MG tablet     Omega-3 Fatty Acids (FISH OIL PO)     Prenatal Multivit-Min-Fe-FA (PRENATAL VITAMINS PO)     propranolol (INDERAL) 20 MG tablet     propranolol ER (INDERAL LA) 80 MG 24 hr capsule     rizatriptan (MAXALT) 10 MG tablet     tiZANidine (ZANAFLEX) 2 MG tablet     VITAMIN D, CHOLECALCIFEROL, PO     No current facility-administered medications for this visit.          ROS:  ROS is done and is negative for general/constitutional, eye, ENT, Respiratory, cardiovascular, GI, , Skin, musculoskeletal except as noted elsewhere.  All other review of systems negative except as noted elsewhere.      OBJECTIVE:  There were no vitals taken for this visit.  GENERAL : Alert and oriented.  Did not seem to be in distress.   RESP: No respiratory distress detected during phone conversation           ASSESSMENT/PLAN:    ASSESSMENT / PLAN:  (Z20.828) Exposure to COVID-19 virus  (primary encounter diagnosis)  Comment:   Plan: Asymptomatic COVID-19 Virus (Coronavirus) by         PCR        Reviewed scheduling process with pt.  Reviewed staying away from others for 14 days from time of  exposure.  Await test results.      (R50.9) Fever and chills  Comment: in March.  Resolved.  Pt requests covid abx test  Plan: COVID-19 Virus (Coronavirus) Antibody & Titer         Reflex        Reviewed testing process with pt.  Await results.          See [facility] for orders, medications, letters, patient instructions    Krystina Babb MD  7/16/2020, 8:55 AM      Phone call duration:  8 minutes

## 2020-07-23 DIAGNOSIS — R50.9 FEVER AND CHILLS: ICD-10-CM

## 2020-07-23 PROCEDURE — 86769 SARS-COV-2 COVID-19 ANTIBODY: CPT | Mod: 90 | Performed by: INTERNAL MEDICINE

## 2020-07-23 PROCEDURE — 99000 SPECIMEN HANDLING OFFICE-LAB: CPT | Performed by: INTERNAL MEDICINE

## 2020-07-23 PROCEDURE — 36415 COLL VENOUS BLD VENIPUNCTURE: CPT | Performed by: INTERNAL MEDICINE

## 2020-07-23 NOTE — LETTER
July 25, 2020        Ruthie Naomylynnerosa elena  [address]  [facility] [address]      COVID-19 Antibody Screen   Date Value Ref Range Status   07/23/2020 Negative  Final     Comment:     No COVID-19 antibodies detected.  Patients within 10 days of symptom onset for   COVID-19 may not produce sufficient levels of detectable antibodies.    Immunocompromised COVID-19 patients may take longer to develop antibodies.       COVID-19 Antibody, IgG Titer   Date Value Ref Range Status   07/23/2020 Not Applicable  Final     Comment:     Qualitative screen for total antibodies to COVID-19 (SARS-CoV-2) with   semi-quantitative measurement of IgG COVID-19 antibodies by endpoint titer.    COVID-19 antibodies may be elevated due to a past or current infection.  Negative results do not rule out COVID-19 infection.  Results from antibody   testing should not be used as the sole basis to diagnose or exclude SARS-CoV-2   infection or to inform infection status.  COVID-19 PCR test should be ordered   if current infection is suspected.  False positive results may occur in rare   cases due to cross-reacting antibodies.  This test was developed and its performance characteristics determined by the   [facility] ([facility]),   which is regulated under CLIA as qualified to perform high-complexity testing.    This test has not been reviewed by the FDA.  Testing performed by [facility], [facility], [address], [address], [place] [address]         You have tested NEGATIVE for COVID-19 antibodies. This suggests you have not had or been exposed to COVID-19. But it does not mean that for sure.     The test finds antibodies in most people 10 days after they get sick. For some people, it takes longer than 10 days for antibodies to show up. Others may never show antibodies against COVID-19, especially if they have weak immune systems.    If  you have COVID-19 symptoms now, please stay home and away from others.     What is antibody testing?    This is a kind of blood test. We take a small sample of your blood, and then test it for something called  antibodies.      Your body makes antibodies to fight infection. If your blood has antibodies for a certain germ, it means you ve been infected with that germ in the past.     Sometimes, antibodies stay in your body for years after you ve had the infection. They can be there even if the germ didn t make you sick. They are a sign that your body fought off the infection.    Will this test find antibodies in everyone who s had COVID-19?    No. The test finds antibodies in most people 10 days after they get sick. For some people, it takes longer than 10 days for antibodies to show up. Others may never show antibodies against COVID-19, especially if they have weak immune systems.    What does it mean if the test finds COVID-19 antibodies?    If we find these antibodies, it suggests:     This person has had the virus.     Their body s immune system fought the virus.     We don t know if this will help protect someone from getting COVID-19 again. Scientists are still learning about this.    What are the signs of COVID-19?    Signs of COVID-19 can appear from 2 to 14 days (up to 2 weeks) after you re infected. Some people have no symptoms or only mild symptoms. Others get very sick. The most common symptoms are:          Cough    Shortness of breath or trouble breathing  Or at least 2 of these symptoms:    Fever    Chills    Repeated shaking with chills    Muscle pain    Headache    Sore throat    Losing your sense of taste or smell    You may have other symptoms. Please contact your doctor or clinic for any symptoms that worry you.    Where can I get more information?     To learn the [place] s guidelines for staying home, please visit the [facility] Department of Health website at  https://www.health.state.[place].us/diseases/coronavirus/basics.html    To learn more about COVID-19 and how to care for yourself at home, please visit the CDC website at https://www.cdc.gov/coronavirus/2019-ncov/about/steps-when-sick.html    For more options for care at [facility], please visit our website at [email or web address]    [place] [facility] ([facility]) COVID-19 Hotline:  [phone]

## 2020-07-24 ENCOUNTER — TELEPHONE (OUTPATIENT)
Dept: OBGYN | Facility: CLINIC | Age: 45
End: 2020-07-24

## 2020-07-24 LAB
COVID-19 SPIKE RBD ABY TITER: NORMAL
COVID-19 SPIKE RBD ABY: NEGATIVE

## 2020-07-24 NOTE — TELEPHONE ENCOUNTER
Patient is wanting to book a virtual/phone visit for as soon as possible to discuss infertility with you. Annemarie Munguia suggested that we reach out to you directly to see if you had any availability left for today to fit her in for a visit.

## 2020-07-24 NOTE — TELEPHONE ENCOUNTER
Sorry did not see until now.  Could she do Tues when I'm on call?  Or I could do end of day Monday?

## 2020-07-24 NOTE — TELEPHONE ENCOUNTER
TC to patient. Scheduled telephone visit for Tues 7/28 at 2pm. Pt states that she is seeing patients in the morning and if she has a cancel will reach out to the clinic to see if AO available to chat sooner.   Cherise Leblanc RN-BSN

## 2020-07-26 ENCOUNTER — VIRTUAL VISIT (OUTPATIENT)
Dept: ORTHOPEDICS | Facility: CLINIC | Age: 45
End: 2020-07-26
Payer: COMMERCIAL

## 2020-07-26 DIAGNOSIS — M54.2 NECK PAIN: Primary | ICD-10-CM

## 2020-07-26 RX ORDER — TIZANIDINE 2 MG/1
2-4 TABLET ORAL 3 TIMES DAILY
Qty: 30 TABLET | Refills: 0 | Status: SHIPPED | OUTPATIENT
Start: 2020-07-26 | End: 2020-11-08

## 2020-07-26 NOTE — LETTER
"    7/26/2020         RE: Ruthie Nash  [address]  [facility] [address]        Dear Colleague,    Thank you for referring your patient, Ruthie Nash, to the [facility]. Please see a copy of my visit note below.    Ruthie Nash is a 45 year old female who is being evaluated via a billable video visit.      The patient has been notified of following:     \"This video visit will be conducted via a call between you and your physician/provider. We have found that certain health care needs can be provided without the need for an in-person physical exam.  This service lets us provide the care you need with a video conversation.  If a prescription is necessary we can send it directly to your pharmacy.  If lab work is needed we can place an order for that and you can then stop by our lab to have the test done at a later time.    Video visits are billed at different rates depending on your insurance coverage.  Please reach out to your insurance provider with any questions.    If during the course of the call the physician/provider feels a video visit is not appropriate, you will not be charged for this service.\"    Patient has given verbal consent for Video visit? Yes  How would you like to obtain your AVS? MyChart  Will anyone else be joining your video visit? No      S: Follow-up visit with Ruthie conducted via video visit for right-sided neck and upper back pain.  She was last seen in January 2019.  At that time she was having right-sided neck pain and right periscapular pain that was quite severe.  Was treated with prednisone, tizanidine, and evaluated with MRI.  Prednisone was significantly helpful in alleviating her symptoms.  Tizanidine was also beneficial.  Ultimately, however it did take up to 6 months for her to be back to baseline.  She had been doing very regular physical therapy visits and home exercises at that time which were helpful.  Since then she is done quite well " until the past week.  Roughly 5 days ago she was pulled by her dog while holding the leash in the right hand.  Later that day she had a recurrence of tightness and pain in the periscapular area on the right.  Not nearly as severe as previous.  She has begun doing her home exercises and taking ibuprofen which is helpful.  No radiation into the right arm.  No tingling numbness or weakness is noted.    O:   General: Alert, pleasant, no distress.  Neck: She has full range of motion of the neck and right upper extremity that is relatively pain-free.  She does report some discomfort in the periscapular area with neck extension and side bending to the right.  Self-reports point tenderness in the periscapular area particularly along the medial scapular border on the right as well as along the paraspinals in the cervical spine    A\P: 45-year-old female with recurrence of right-sided cervical and periscapular pain.  Likely muscular in etiology.    Discussed both short and long-term treatment options in detail  For her acute symptoms she can continue using the home exercises she has previously been given.  She was given a new referral to physical therapy.  Additionally she can continue to use ibuprofen.  New prescription for tizanidine was given to be used as needed.  For long-term treatment I discussed that she should work on workspace modifications and posture modifications.  This can be discussed further with physical therapist so that she can best avoid a head forward shoulder support type posture that is common in desk/computer work that is likely contributing to her symptoms.    Praneeth Hill MD        Video-Visit Details    Type of service:  Video Visit    Video Start Time: 0913  Video End Time: 0931    Originating Location (pt. Location): Home    Distant Location (provider location):  [facility] CLIN[facility]     Platform used for Video Visit: [facility]    Praneeth Hill MD

## 2020-07-26 NOTE — PROGRESS NOTES
"Ruthie Nash is a 45 year old female who is being evaluated via a billable video visit.      The patient has been notified of following:     \"This video visit will be conducted via a call between you and your physician/provider. We have found that certain health care needs can be provided without the need for an in-person physical exam.  This service lets us provide the care you need with a video conversation.  If a prescription is necessary we can send it directly to your pharmacy.  If lab work is needed we can place an order for that and you can then stop by our lab to have the test done at a later time.    Video visits are billed at different rates depending on your insurance coverage.  Please reach out to your insurance provider with any questions.    If during the course of the call the physician/provider feels a video visit is not appropriate, you will not be charged for this service.\"    Patient has given verbal consent for Video visit? Yes  How would you like to obtain your AVS? MyChart  Will anyone else be joining your video visit? No      S: Follow-up visit with Ruthie conducted via video visit for right-sided neck and upper back pain.  She was last seen in January 2019.  At that time she was having right-sided neck pain and right periscapular pain that was quite severe.  Was treated with prednisone, tizanidine, and evaluated with MRI.  Prednisone was significantly helpful in alleviating her symptoms.  Tizanidine was also beneficial.  Ultimately, however it did take up to 6 months for her to be back to baseline.  She had been doing very regular physical therapy visits and home exercises at that time which were helpful.  Since then she is done quite well until the past week.  Roughly 5 days ago she was pulled by her dog while holding the leash in the right hand.  Later that day she had a recurrence of tightness and pain in the periscapular area on the right.  Not nearly as severe as previous.  She has begun " doing her home exercises and taking ibuprofen which is helpful.  No radiation into the right arm.  No tingling numbness or weakness is noted.    O:   General: Alert, pleasant, no distress.  Neck: She has full range of motion of the neck and right upper extremity that is relatively pain-free.  She does report some discomfort in the periscapular area with neck extension and side bending to the right.  Self-reports point tenderness in the periscapular area particularly along the medial scapular border on the right as well as along the paraspinals in the cervical spine    A\P: 45-year-old female with recurrence of right-sided cervical and periscapular pain.  Likely muscular in etiology.    Discussed both short and long-term treatment options in detail  For her acute symptoms she can continue using the home exercises she has previously been given.  She was given a new referral to physical therapy.  Additionally she can continue to use ibuprofen.  New prescription for tizanidine was given to be used as needed.  For long-term treatment I discussed that she should work on workspace modifications and posture modifications.  This can be discussed further with physical therapist so that she can best avoid a head forward shoulder support type posture that is common in desk/computer work that is likely contributing to her symptoms.    Praneeth Hill MD        Video-Visit Details    Type of service:  Video Visit    Video Start Time: 0913  Video End Time: 0931    Originating Location (pt. Location): Home    Distant Location (provider location):  [facility] CLINIC     Platform used for Video Visit: [facility]    Praneeth Hill MD

## 2020-07-28 ENCOUNTER — VIRTUAL VISIT (OUTPATIENT)
Dept: OBGYN | Facility: CLINIC | Age: 45
End: 2020-07-28
Payer: COMMERCIAL

## 2020-07-28 DIAGNOSIS — Z31.69 PRE-CONCEPTION COUNSELING: Primary | ICD-10-CM

## 2020-07-28 PROCEDURE — 99213 OFFICE O/P EST LOW 20 MIN: CPT | Mod: 95 | Performed by: OBSTETRICS & GYNECOLOGY

## 2020-07-28 SDOH — HEALTH STABILITY: MENTAL HEALTH: HOW MANY STANDARD DRINKS CONTAINING ALCOHOL DO YOU HAVE ON A TYPICAL DAY?: 1 OR 2

## 2020-07-28 SDOH — HEALTH STABILITY: MENTAL HEALTH: HOW OFTEN DO YOU HAVE A DRINK CONTAINING ALCOHOL?: MONTHLY OR LESS

## 2020-07-28 SDOH — HEALTH STABILITY: MENTAL HEALTH: HOW OFTEN DO YOU HAVE 6 OR MORE DRINKS ON ONE OCCASION?: NEVER

## 2020-07-28 NOTE — PROGRESS NOTES
"Ruthie Nash is a 45 year old female who is being evaluated via a billable telephone visit.      The patient has been notified of following:     \"This telephone visit will be conducted via a call between you and your physician/provider. We have found that certain health care needs can be provided without the need for a physical exam.  This service lets us provide the care you need with a short phone conversation.  If a prescription is necessary we can send it directly to your pharmacy.  If lab work is needed we can place an order for that and you can then stop by our lab to have the test done at a later time.    Telephone visits are billed at different rates depending on your insurance coverage. During this emergency period, for some insurers they may be billed the same as an in-person visit.  Please reach out to your insurance provider with any questions.    If during the course of the call the physician/provider feels a telephone visit is not appropriate, you will not be charged for this service.\"    Patient has given verbal consent for Telephone visit?  Yes    What phone number would you like to be contacted at? [phone]    How would you like to obtain your AVS? Kathrine    Phone call duration: 16 minutes    Additional notes:  Ruthie presents for preconception consultation.  She and her  are considering another baby and she is wondering what the implications of pregnancy and childbearing are at her age.  Periods still regular though she is on progesterone only pills.  Some periods are lighter, some periods are heavier.  She experiences migraines and insomnia and is otherwise healthy.  She recently had a negative genetic test for cardiac risk factors.  She has done genetic testing with her prior pregnancies.    Past Medical History:   Diagnosis Date     Body aches      Chronic headache      LENORE (generalised anxiety disorder) 6/23/2015     Headache      Insomnia 12/10/2008     LSIL (low grade squamous " intraepithelial lesion) on Pap smear 2007    also +HPV, colps WNL x two     No past surgical history on file.  Social History     Socioeconomic History     Marital status: Single     Spouse name: Not on file     Number of children: Not on file     Years of education: Not on file     Highest education level: Not on file   Occupational History     Not on file   Social Needs     Financial resource strain: Not on file     Food insecurity     Worry: Not on file     Inability: Not on file     Transportation needs     Medical: Not on file     Non-medical: Not on file   Tobacco Use     Smoking status: Never Smoker     Smokeless tobacco: Never Used   Substance and Sexual Activity     Alcohol use: Yes     Frequency: Monthly or less     Drinks per session: 1 or 2     Binge frequency: Never     Comment: socially     Drug use: No     Sexual activity: Yes     Partners: Male     Birth control/protection: Pill   Lifestyle     Physical activity     Days per week: Not on file     Minutes per session: Not on file     Stress: Not on file   Relationships     Social connections     Talks on phone: Not on file     Gets together: Not on file     Attends Jew service: Not on file     Active member of club or organization: Not on file     Attends meetings of clubs or organizations: Not on file     Relationship status: Not on file     Intimate partner violence     Fear of current or ex partner: Not on file     Emotionally abused: Not on file     Physically abused: Not on file     Forced sexual activity: Not on file   Other Topics Concern     Parent/sibling w/ CABG, MI or angioplasty before 65F 55M? No   Social History Narrative     Not on file     Current Outpatient Medications   Medication     Acetaminophen (TYLENOL PO)     aluminum chloride (DRYSOL) 20 % external solution     clindamycin (CLINDAGEL) 1 % external gel     norethindrone (MICRONOR) 0.35 MG tablet     Omega-3 Fatty Acids (FISH OIL PO)     Prenatal Multivit-Min-Fe-FA  (PRENATAL VITAMINS PO)     propranolol (INDERAL) 20 MG tablet     propranolol ER (INDERAL LA) 80 MG 24 hr capsule     rizatriptan (MAXALT) 10 MG tablet     tiZANidine (ZANAFLEX) 2 MG tablet     tiZANidine (ZANAFLEX) 2 MG tablet     VITAMIN D, CHOLECALCIFEROL, PO     No current facility-administered medications for this visit.         Allergies   Allergen Reactions     Birch Trees      Dust Mites      Hay Fever & [A.R.M.]      Mold      Family History   Problem Relation Age of Onset     Allergies Mother      Hypertension Mother      Mental Illness Mother      Hypertension Father      Mental Illness Father      Family History Negative Paternal Grandmother      Heart Disease Maternal Grandmother      Obesity Maternal Grandmother      Diabetes Maternal Grandmother      Cancer Maternal Grandfather      Heart Disease Paternal Grandfather      Obesity Paternal Grandfather      Diabetes Paternal Grandfather      Family History Negative Sister      Family History Negative Brother      Family History Negative Brother      A/P:  45 year old  with   (Z31.69) Pre-conception counseling  (primary encounter diagnosis)  Comment:   Plan:   First we discussed the impact of pregnancy on a woman of advanced maternal age.  We discussed that pregnancy is sometimes harder to tolerate as age advances.  She would have higher risk of developing hypertensive complications and gestational diabetes.  She could be at increased risk of cardiovascular complications although she has no pre-existing conditions.  We then discussed the impact of maternal age on the pregnancy itself.  We discussed to the increased miscarriage rate at age 46, nearing 40%.  We discussed the significantly increased risk of fetal aneuploidy.  We discussed the increased risk of autism spectrum disorders, the overall incidence is low.  We discussed diminished ovarian reserve and that age-related fertility decline is accelerated in this age range.    She is taking  prenatal vitamins and plans to continue.  She feels satisfied with answers to these questions.  I will see her soon for annual exam.        Marissa Skaggs MD

## 2020-07-30 ENCOUNTER — THERAPY VISIT (OUTPATIENT)
Dept: PHYSICAL THERAPY | Facility: CLINIC | Age: 45
End: 2020-07-30
Attending: FAMILY MEDICINE
Payer: COMMERCIAL

## 2020-07-30 DIAGNOSIS — M54.2 NECK PAIN: ICD-10-CM

## 2020-07-30 PROCEDURE — 97110 THERAPEUTIC EXERCISES: CPT | Mod: GP | Performed by: PHYSICAL THERAPIST

## 2020-07-30 PROCEDURE — 98966 PH1 ASSMT&MGMT NQHP 5-10: CPT | Mod: GP | Performed by: PHYSICAL THERAPIST

## 2020-08-06 ENCOUNTER — THERAPY VISIT (OUTPATIENT)
Dept: PHYSICAL THERAPY | Facility: CLINIC | Age: 45
End: 2020-08-06
Payer: COMMERCIAL

## 2020-08-06 ENCOUNTER — VIRTUAL VISIT (OUTPATIENT)
Dept: NEUROLOGY | Facility: CLINIC | Age: 45
End: 2020-08-06
Payer: COMMERCIAL

## 2020-08-06 DIAGNOSIS — M54.2 NECK PAIN: Primary | ICD-10-CM

## 2020-08-06 DIAGNOSIS — G43.909 MIGRAINE WITHOUT STATUS MIGRAINOSUS, NOT INTRACTABLE, UNSPECIFIED MIGRAINE TYPE: Primary | ICD-10-CM

## 2020-08-06 DIAGNOSIS — M54.12 CERVICAL RADICULOPATHY: ICD-10-CM

## 2020-08-06 PROCEDURE — 97110 THERAPEUTIC EXERCISES: CPT | Mod: GP | Performed by: PHYSICAL THERAPIST

## 2020-08-06 PROCEDURE — 97112 NEUROMUSCULAR REEDUCATION: CPT | Mod: GP | Performed by: PHYSICAL THERAPIST

## 2020-08-06 RX ORDER — NARATRIPTAN 2.5 MG/1
2.5 TABLET ORAL
Qty: 18 TABLET | Refills: 3 | Status: SHIPPED | OUTPATIENT
Start: 2020-08-06

## 2020-08-06 RX ORDER — PROPRANOLOL HYDROCHLORIDE 80 MG/1
80 CAPSULE, EXTENDED RELEASE ORAL 2 TIMES DAILY
Qty: 180 CAPSULE | Refills: 3 | Status: SHIPPED | OUTPATIENT
Start: 2020-08-06 | End: 2020-11-08

## 2020-08-06 NOTE — LETTER
8/6/2020       RE: Ruthie Nash  [address]  [facility] [address]     Dear Colleague,    Thank you for referring your patient, Ruthie Nash, to the [facility] NEUROLOGY at [facility]. Please see a copy of my visit note below.    Ruthie Nash is a 45 year old female who is being evaluated via a billable video visit.        Video-Visit Details    Type of service:  Video Visit    Video Start Time: 12:39 PM  Video End Time: 12:48 PM    Originating Location (pt. Location): Home    Distant Location (provider location):  [facility] NEUROLOGY     Platform used for Video Visit: Kate Miranda ATC    [facility] and [facility]  Neurology Progress Note    Subjective:    Ms. Nash returns for follow-up of frequent migraine.  At her last visit, we optimized her dose of propranolol and I offered rizatriptan as an acute treatment option.      Since that time, she reports that her headaches are doing well currently. She estimates she has headaches once weekly, and is happy with his progress.  Typical worsening of headaches occur in the spring and the winter for her.    For acute treatment of headache, she takes Ibprofen for headache once weekly.  This is helpful for her more mild headaches.    For acute treatment of moderate to severe headache, she tried rizatriptan on a few occasions, but this made her sleepy and didn't take away her headache.     She continues to take propranolol extended release tablets 80 mg twice a day.  In the beginning, she had some mild lightheadedness, but this has resolved.  She requests a refill.    She was pulled by a dog, causing some musculoskeletal pain.  This did not affect her headaches, and she is mostly recovered from this event.    Objective:    Vitals: There were no vitals taken for this visit.  General: Cooperative, NAD  Head: Atraumatic  Neurologic:  Mental Status: Fully alert, attentive and  oriented. Speech clear and fluent.   Cranial Nerves: Facial movements symmetric.   Motor: No abnormal movements.     Assessment/Plan:   Ruthie Nash is a 45-year-old woman who returns for follow-up of migraine.  She has had a significant reduction in headache attacks in the setting of titrating up on propranolol to 80 mg twice a day.    But for now we will continue her current treatment strategy of propranolol 80 mg twice daily.  She prefers the extended release formula.  I renewed this for her.    For acute treatment of headache, she had significant side effects with rizatriptan, including sleepiness.  I offered her a trial of a different triptan, naratriptan 0.5 mg.  I will put this prescription in for her, so that she has it available if she has a severe attack again.  Otherwise, she will continue ibuprofen for more mild attacks.    I recommended that we continue this plan for the next 6 months as we enter the winter months, and continue to monitor her progress.  She will contact me in the meantime if she has concerns.    I spent 9 minutes with the patient, over half of which was counseling.    Evi Goodson MD  Neurology   Pager [phone]

## 2020-08-06 NOTE — PROGRESS NOTES
"Ruthie Nash is a 45 year old female who is being evaluated via a billable video visit.      The patient has been notified of following:     \"This video visit will be conducted via a call between you and your physician/provider. We have found that certain health care needs can be provided without the need for an in-person physical exam.  This service lets us provide the care you need with a video conversation.  If a prescription is necessary we can send it directly to your pharmacy.  If lab work is needed we can place an order for that and you can then stop by our lab to have the test done at a later time.    Video visits are billed at different rates depending on your insurance coverage.  Please reach out to your insurance provider with any questions.    If during the course of the call the physician/provider feels a video visit is not appropriate, you will not be charged for this service.\"    Patient has given verbal consent for Video visit? Yes  How would you like to obtain your AVS? MyChart  If you are dropped from the video visit, the video invite should be resent to: Text to cell phone: [phone]  Will anyone else be joining your video visit? No        Video-Visit Details    Type of service:  Video Visit    Video Start Time: 12:39 PM  Video End Time: 12:48 PM    Originating Location (pt. Location): Home    Distant Location (provider location):  [facility] NEUROLOGY     Platform used for Video Visit: Kate Miranda ATC    [facility]  Neurology Progress Note    Subjective:    Ms. Nash returns for follow-up of frequent migraine.  At her last visit, we optimized her dose of propranolol and I offered rizatriptan as an acute treatment option.      Since that time, she reports that her headaches are doing well currently. She estimates she has headaches once weekly, and is happy with his progress.  Typical worsening of headaches occur in the spring " and the winter for her.    For acute treatment of headache, she takes Ibprofen for headache once weekly.  This is helpful for her more mild headaches.    For acute treatment of moderate to severe headache, she tried rizatriptan on a few occasions, but this made her sleepy and didn't take away her headache.     She continues to take propranolol extended release tablets 80 mg twice a day.  In the beginning, she had some mild lightheadedness, but this has resolved.  She requests a refill.    She was pulled by a dog, causing some musculoskeletal pain.  This did not affect her headaches, and she is mostly recovered from this event.    Objective:    Vitals: There were no vitals taken for this visit.  General: Cooperative, NAD  Head: Atraumatic  Neurologic:  Mental Status: Fully alert, attentive and oriented. Speech clear and fluent.   Cranial Nerves: Facial movements symmetric.   Motor: No abnormal movements.     Assessment/Plan:   Ruthie Nash is a 45-year-old woman who returns for follow-up of migraine.  She has had a significant reduction in headache attacks in the setting of titrating up on propranolol to 80 mg twice a day.    But for now we will continue her current treatment strategy of propranolol 80 mg twice daily.  She prefers the extended release formula.  I renewed this for her.    For acute treatment of headache, she had significant side effects with rizatriptan, including sleepiness.  I offered her a trial of a different triptan, naratriptan 0.5 mg.  I will put this prescription in for her, so that she has it available if she has a severe attack again.  Otherwise, she will continue ibuprofen for more mild attacks.    I recommended that we continue this plan for the next 6 months as we enter the winter months, and continue to monitor her progress.  She will contact me in the meantime if she has concerns.    I spent 9 minutes with the patient, over half of which was counseling.    Evi Goodson MD  Neurology    Pager [phone]

## 2020-08-13 ENCOUNTER — THERAPY VISIT (OUTPATIENT)
Dept: PHYSICAL THERAPY | Facility: CLINIC | Age: 45
End: 2020-08-13
Payer: COMMERCIAL

## 2020-08-13 DIAGNOSIS — M54.2 NECK PAIN: Primary | ICD-10-CM

## 2020-08-13 PROCEDURE — 97110 THERAPEUTIC EXERCISES: CPT | Mod: 95 | Performed by: PHYSICAL THERAPIST

## 2020-08-13 NOTE — PROGRESS NOTES
Subjective:  HPI  Physical Exam                    Objective:  System    Physical Exam    General     ROS    Assessment/Plan:    {REHAB NOTES:978648}

## 2020-08-13 NOTE — PROGRESS NOTES
DISCHARGE REPORT    Progress reporting period is from 7/30/2020 to 8/13/2020.       SUBJECTIVE  Subjective changes noted by patient: Symtpoms are getting better, retraction exercises are going well when has time, has not noticed the before bed pain, hardly any shoulder blade pain at all    Current pain level is 0/10 Current Pain level: 0/10.     Previous pain level was  4/10 Initial Pain level: 4/10.   Changes in function:  Yes (See Goal flowsheet attached for changes in current functional level)  Adverse reaction to treatment or activity: None    OBJECTIVE  Changes noted in objective findings:  Yes.  Objective: Rotation is equal and full, responding well to directional preference for ret/ext     ASSESSMENT/PLAN  Updated problem list and treatment plan: Diagnosis 1:  Cervical Pain  Pain -  self management, education, directional preference exercise and home program  Decreased ROM/flexibility - therapeutic exercise and therapeutic activity  Decreased function - therapeutic activities and home program  STG/LTGs have been met or progress has been made towards goals:  Yes (See Goal flow sheet completed today.)  Assessment of Progress: The patient's condition is improving.  Self Management Plans:  Patient has been instructed in a home treatment program.  Patient is independent in a home treatment program.  Patient  has been instructed in self management of symptoms.  Patient is independent in self management of symptoms.  I have re-evaluated this patient and find that the nature, scope, duration and intensity of the therapy is appropriate for the medical condition of the patient.  Ruthie continues to require the following intervention to meet STG and LTG's:  PT intervention is no longer required to meet STG/LTG.    Recommendations:  This patient is ready to be discharged from therapy and continue their home treatment program.    Please refer to the daily flowsheet for treatment today, total treatment time and time spent  performing 1:1 timed codes.

## 2020-08-14 DIAGNOSIS — Z30.41 SURVEILLANCE OF PREVIOUSLY PRESCRIBED CONTRACEPTIVE PILL: ICD-10-CM

## 2020-08-14 RX ORDER — ACETAMINOPHEN AND CODEINE PHOSPHATE 120; 12 MG/5ML; MG/5ML
0.35 SOLUTION ORAL DAILY
Qty: 84 TABLET | Refills: 0 | Status: SHIPPED | OUTPATIENT
Start: 2020-08-14 | End: 2020-11-08

## 2020-08-14 NOTE — TELEPHONE ENCOUNTER
Pharmacy sent refill request for OCP's. Patient has AE scheduled for 9/11/20. Per protocol, refill sent. Cherise Moody RN

## 2020-11-08 ENCOUNTER — MYC REFILL (OUTPATIENT)
Dept: ORTHOPEDICS | Facility: CLINIC | Age: 45
End: 2020-11-08

## 2020-11-08 ENCOUNTER — MYC REFILL (OUTPATIENT)
Dept: NEUROLOGY | Facility: CLINIC | Age: 45
End: 2020-11-08

## 2020-11-08 ENCOUNTER — MYC REFILL (OUTPATIENT)
Dept: FAMILY MEDICINE | Facility: CLINIC | Age: 45
End: 2020-11-08

## 2020-11-08 DIAGNOSIS — M54.2 NECK PAIN: ICD-10-CM

## 2020-11-08 DIAGNOSIS — G43.909 MIGRAINE WITHOUT STATUS MIGRAINOSUS, NOT INTRACTABLE, UNSPECIFIED MIGRAINE TYPE: ICD-10-CM

## 2020-11-08 DIAGNOSIS — L08.89 PITTED KERATOLYSIS: ICD-10-CM

## 2020-11-09 RX ORDER — PROPRANOLOL HYDROCHLORIDE 80 MG/1
80 CAPSULE, EXTENDED RELEASE ORAL 2 TIMES DAILY
Qty: 180 CAPSULE | Refills: 3 | Status: SHIPPED | OUTPATIENT
Start: 2020-11-09 | End: 2021-08-04

## 2020-11-09 NOTE — TELEPHONE ENCOUNTER
Rx Authorization:    Requested Medication/ Dose: Propranolo ER 80MG 24 HR caps    Date last refill ordered: 8/6/20    Quantity ordered: 180 caps    # refills: 3    Date of last clinic visit with ordering provider: 8/6/20    Date of next clinic visit with ordering provider: 12/10/20    All pertinent protocol data (lab date/result):     Include pertinent information from patients message:

## 2020-11-10 RX ORDER — CLINDAMYCIN PHOSPHATE 10 MG/G
GEL TOPICAL
Qty: 60 G | Refills: 0 | Status: SHIPPED | OUTPATIENT
Start: 2020-11-10 | End: 2021-01-21

## 2020-11-10 NOTE — TELEPHONE ENCOUNTER
Prescription approved per [facility] Refill Protocol.  Elizabeth Easley RN

## 2020-11-11 RX ORDER — TIZANIDINE 2 MG/1
2-4 TABLET ORAL 3 TIMES DAILY
Qty: 30 TABLET | Refills: 0 | Status: SHIPPED | OUTPATIENT
Start: 2020-11-11 | End: 2021-01-11

## 2020-12-06 ENCOUNTER — HEALTH MAINTENANCE LETTER (OUTPATIENT)
Age: 45
End: 2020-12-06

## 2020-12-15 ENCOUNTER — VIRTUAL VISIT (OUTPATIENT)
Dept: DERMATOLOGY | Facility: CLINIC | Age: 45
End: 2020-12-15
Payer: COMMERCIAL

## 2020-12-15 DIAGNOSIS — L72.0 EIC (EPIDERMAL INCLUSION CYST): ICD-10-CM

## 2020-12-15 DIAGNOSIS — L98.8 RHYTIDES: ICD-10-CM

## 2020-12-15 DIAGNOSIS — R23.8 FACIAL AGING: Primary | ICD-10-CM

## 2020-12-15 PROCEDURE — 99202 OFFICE O/P NEW SF 15 MIN: CPT | Mod: 95 | Performed by: DERMATOLOGY

## 2020-12-15 RX ORDER — TRETINOIN 0.25 MG/G
CREAM TOPICAL
Qty: 45 G | Refills: 11 | Status: SHIPPED | OUTPATIENT
Start: 2020-12-15 | End: 2021-08-05

## 2020-12-15 NOTE — NURSING NOTE
"Chief Complaint   Patient presents with     Derm Problem     Ruthie is having a virtual visit for skin issues. She states ' I have a bump on my chest and I want to discuss a retin- a prescription.\"     Yary Reilly, GERAMN  "

## 2020-12-15 NOTE — PROGRESS NOTES
" [facility] Record:  Store and Forward and Video ( Invitation sent by:  [employer] waiting room )      Dermatology Problem List:  1. Facial aging/rhytides.  - Tretinoin 0.025% cream at bedtime  - CE Ferulic   - daily sunscreen  2. EIC, central chest. Referral to derm surg.     Encounter Date: Dec 15, 2020    CC:   Chief Complaint   Patient presents with     Derm Problem     Ruthie is having a virtual visit for skin issues. She states ' I have a bump on my chest and I want to discuss a retin- a prescription.\"       History of Present Illness:  Ruthie Nash is a 45 year old female who presents for a few concerns today including:    (1) Facial aging - reports she has noticed increased wrinkles, especially around the eyes, over the last few weeks. She reports she wanted to discuss with a dermatologist the best preventative therapies, particularly topical therapies, to prevent further skin aging. Of note, she is not interested in pursuing injectable medications including neuromodulators or filler injections, and does not want to pursue laser treatments. She has been using a SPF 30 sunscreen daily, but otherwise not tried other treatments. Does report numerous sunburns and sun exposure as a child.     (2) Cyst in the central chest area. States this first started as a large \"pimple\" like spot that has never resolved, leaving a small \"ball under the skin\" with a blackhead overlying. Lesion has not drained recently, and is minimally itchy, but not tender or painful. She would like to discuss removing this if possible.     Of note, patient is [place] of [place] in [place]. Health otherwise stable. No other skin concerns.     ROS: Patient is generally feeling well today.     Physical Examination:  General: Well-appearing female, appropriately-developed individual.  Skin: Focused examination including chest and face was performed.   - Durga Type II  - Rhytides /crows feet on right periocular region  - Telangectasias on " right cheek  - ~1.0 cm cystic nodule with small punctum on the central chest.             Labs:  None.     Past Medical History:   Patient Active Problem List   Diagnosis     Insomnia     Atopic rhinitis     Adjustment disorder with anxiety     CARDIOVASCULAR SCREENING; LDL GOAL LESS THAN 160     Hyperhidrosis of feet     Pitted keratolysis     Skin tag     Pain in joint, upper arm     Aftercare for healing traumatic fracture of upper arm     Other postprocedural status(V45.89)     Low back pain     Lumbago     Pain in joint, pelvic region and thigh     Moderate major depression (H)     Body aches     Vitamin D deficiency     Headache     Generalized anxiety disorder     Laryngitis     Cervicalgia     Migraine without aura and without status migrainosus, not intractable     Chronic intractable headache, unspecified headache type     Excessive or frequent menstruation     Trapezius muscle spasm     Past Medical History:   Diagnosis Date     Body aches      Chronic headache      LENORE (generalised anxiety disorder) 6/23/2015     Headache      Insomnia 12/10/2008     LSIL (low grade squamous intraepithelial lesion) on Pap smear 2007    also +HPV, colps WNL x two     No past surgical history on file.    Social History:  Patient reports that she has never smoked. She has never used smokeless tobacco. She reports current alcohol use. She reports that she does not use drugs.    Family History:  Family History   Problem Relation Age of Onset     Allergies Mother      Hypertension Mother      Mental Illness Mother      Hypertension Father      Mental Illness Father      Family History Negative Paternal Grandmother      Heart Disease Maternal Grandmother      Obesity Maternal Grandmother      Diabetes Maternal Grandmother      Cancer Maternal Grandfather      Heart Disease Paternal Grandfather      Obesity Paternal Grandfather      Diabetes Paternal Grandfather      Family History Negative Sister      Family History Negative  Brother      Family History Negative Brother        Medications:  Current Outpatient Medications   Medication     Acetaminophen (TYLENOL PO)     aluminum chloride (DRYSOL) 20 % external solution     clindamycin (CLINDAGEL) 1 % external gel     naratriptan (AMERGE) 2.5 MG tablet     norethindrone (MICRONOR) 0.35 MG tablet     Omega-3 Fatty Acids (FISH OIL PO)     Prenatal Multivit-Min-Fe-FA (PRENATAL VITAMINS PO)     propranolol (INDERAL) 20 MG tablet     propranolol ER (INDERAL LA) 80 MG 24 hr capsule     tiZANidine (ZANAFLEX) 2 MG tablet     tiZANidine (ZANAFLEX) 2 MG tablet     VITAMIN D, CHOLECALCIFEROL, PO     No current facility-administered medications for this visit.           Allergies   Allergen Reactions     Birch Trees      Dust Mites      Hay Fever & [A.R.M.]      Mold      Impression and Recommendations (Patient Counseled on the Following):    1. Facial aging/rhytides.   - Start SPF 30 or greater daily sunscreen; consider Elta MD zinc-oxide containing sunscreen  - Start CE ferulic qdaily  - Start tretinoin 0.025% cream at bedtime. Side effects including skin irritation, dryness, and photosensitivity reviewed. Asked patient to start every other night or every third night and increase slowly. [employer] coupon provided and reviewed this may not be covered by insurance.   - Diligent moisturziation; recommended Vanicream, CeraVe, or Cetaphil  - Briefly reviewed other options for treatment of facial aging including but not limited to injectable neuromodulators or fillers, chemical peels, laser treatments. Deferred pursuing these for now.     2. EIC, central chest. Referral to derm surg for excision.     Follow-up:   Follow-up with derm surg, otherwise PRN as [place]state.     Staff only    Manjinder Pandey MD  Pronouns: he/him/his    Department of Dermatology  [facility]: Phone: [phone],  Fax:[phone]  [facility]: Phone: [phone] Fax: [phone]    _____________________________________________________________________________    Teledermatology information:  - Location of patient: [place]  - Patient presented as: self referral  - Location of teledermatologist:  ([facility] [place] )  - Image quality and interpretability: acceptable  - Physician has received verbal consent for a Video/Photos Visit from the patient? YES  - In-person dermatology visit recommendation: no  - Date of images: 12/15/2020  - Service start time: 2:45 PM  - Service end time: 3:08 PM  - Date of report: 12/15/2020    23-Jan-2018 10:02

## 2020-12-15 NOTE — LETTER
"12/15/2020       RE: Ruthie Nash  [address]  [facility] [address]     Dear Colleague,    Thank you for referring your patient, Ruthie Nash, to the [facility] DERMATOLOGY CL[facility]IC [place] at [facility]. Please see a copy of my visit note below.    [facility] Dermatology Record:  Store and Forward and Video ( Invitation sent by:  [facility] waiting room )      Dermatology Problem List:  1. Facial aging/rhytides.  - Tretinoin 0.025% cream at bedtime  - CE Ferulic   - daily sunscreen  2. EIC, central chest. Referral to derm surg.     Encounter Date: Dec 15, 2020    CC:   Chief Complaint   Patient presents with     Derm Problem     Ruthie is having a virtual visit for skin issues. She states ' I have a bump on my chest and I want to discuss a retin- a prescription.\"       History of Present Illness:  Ruthie Nash is a 45 year old female who presents for a few concerns today including:    (1) Facial aging - reports she has noticed increased wrinkles, especially around the eyes, over the last few weeks. She reports she wanted to discuss with a dermatologist the best preventative therapies, particularly topical therapies, to prevent further skin aging. Of note, she is not interested in pursuing injectable medications including neuromodulators or filler injections, and does not want to pursue laser treatments. She has been using a SPF 30 sunscreen daily, but otherwise not tried other treatments. Does report numerous sunburns and sun exposure as a child.     (2) Cyst in the central chest area. States this first started as a large \"pimple\" like spot that has never resolved, leaving a small \"ball under the skin\" with a blackhead overlying. Lesion has not drained recently, and is minimally itchy, but not tender or painful. She would like to discuss removing this if possible.     Of note, patient is moving out of state in February. Health otherwise stable. No other skin concerns. "     ROS: Patient is generally feeling well today.     Physical Examination:  General: Well-appearing female, appropriately-developed individual.  Skin: Focused examination including chest and face was performed.   - Durga Type II  - Rhytides /crows feet on right periocular region  - Telangectasias on right cheek  - ~1.0 cm cystic nodule with small punctum on the central chest.             Labs:  None.     Past Medical History:   Patient Active Problem List   Diagnosis     Insomnia     Atopic rhinitis     Adjustment disorder with anxiety     CARDIOVASCULAR SCREENING; LDL GOAL LESS THAN 160     Hyperhidrosis of feet     Pitted keratolysis     Skin tag     Pain in joint, upper arm     Aftercare for healing traumatic fracture of upper arm     Other postprocedural status(V45.89)     Low back pain     Lumbago     Pain in joint, pelvic region and thigh     Moderate major depression (H)     Body aches     Vitamin D deficiency     Headache     Generalized anxiety disorder     Laryngitis     Cervicalgia     Migraine without aura and without status migrainosus, not intractable     Chronic intractable headache, unspecified headache type     Excessive or frequent menstruation     Trapezius muscle spasm     Past Medical History:   Diagnosis Date     Body aches      Chronic headache      LENORE (generalised anxiety disorder) 6/23/2015     Headache      Insomnia 12/10/2008     LSIL (low grade squamous intraepithelial lesion) on Pap smear 2007    also +HPV, colps WNL x two     No past surgical history on file.    Social History:  Patient reports that she has never smoked. She has never used smokeless tobacco. She reports current alcohol use. She reports that she does not use drugs.    Family History:  Family History   Problem Relation Age of Onset     Allergies Mother      Hypertension Mother      Mental Illness Mother      Hypertension Father      Mental Illness Father      Family History Negative Paternal Grandmother      Heart  Disease Maternal Grandmother      Obesity Maternal Grandmother      Diabetes Maternal Grandmother      Cancer Maternal Grandfather      Heart Disease Paternal Grandfather      Obesity Paternal Grandfather      Diabetes Paternal Grandfather      Family History Negative Sister      Family History Negative Brother      Family History Negative Brother        Medications:  Current Outpatient Medications   Medication     Acetaminophen (TYLENOL PO)     aluminum chloride (DRYSOL) 20 % external solution     clindamycin (CLINDAGEL) 1 % external gel     naratriptan (AMERGE) 2.5 MG tablet     norethindrone (MICRONOR) 0.35 MG tablet     Omega-3 Fatty Acids (FISH OIL PO)     Prenatal Multivit-Min-Fe-FA (PRENATAL VITAMINS PO)     propranolol (INDERAL) 20 MG tablet     propranolol ER (INDERAL LA) 80 MG 24 hr capsule     tiZANidine (ZANAFLEX) 2 MG tablet     tiZANidine (ZANAFLEX) 2 MG tablet     VITAMIN D, CHOLECALCIFEROL, PO     No current facility-administered medications for this visit.           Allergies   Allergen Reactions     Birch Trees      Dust Mites      Hay Fever & [A.R.M.]      Mold      Impression and Recommendations (Patient Counseled on the Following):    1. Facial aging/rhytides.   - Start SPF 30 or greater daily sunscreen; consider Elta MD zinc-oxide containing sunscreen  - Start CE ferulic qdaily  - Start tretinoin 0.025% cream at bedtime. Side effects including skin irritation, dryness, and photosensitivity reviewed. Asked patient to start every other night or every third night and increase slowly. Goodrx.com coupon provided and reviewed this may not be covered by insurance.   - Diligent moisturziation; recommended Vanicream, CeraVe, or Cetaphil  - Briefly reviewed other options for treatment of facial aging including but not limited to injectable neuromodulators or fillers, chemical peels, laser treatments. Deferred pursuing these for now.     2. EIC, central chest. Referral to derm surg for excision.      Follow-up:   Follow-up with derm surg, otherwise PRN as patient will be moving out-of-state.     Staff only    Manjinder Pandey MD  Pronouns: he/him/his    Department of Dermatology  [facility]: Phone: [phone], Fax:[phone]  [facility]: Phone: [phone] Fax: [phone]    _____________________________________________________________________________    Teledermatology information:  - Location of patient: [place]  - Patient presented as: self referral  - Location of teledermatologist:  ([facility] DERMATOLOGY CLINIC [place] )  - Image quality and interpretability: acceptable  - Physician has received verbal consent for a Video/Photos Visit from the patient? YES  - In-person dermatology visit recommendation: no  - Date of images: 12/15/2020  - Service start time: 2:45 PM  - Service end time: 3:08 PM  - Date of report: 12/15/2020

## 2020-12-15 NOTE — PATIENT INSTRUCTIONS
[facility] Dermatology Visit    Thank you for allowing us to participate in your care. Your findings, instructions and follow-up plan are as follows:    Facial aging.   1. Start using over-the-counter SPF 30 or greater sunscreen daily on sun-exposed areas such as face, ears, neck, back of hands/forearms. Consider No-ad sunscreen or Elta MD zinc oxide sunscreen.   2. Consider using Skinceuticals CE Ferulic serum in the morning (apply 4-5 drops to the face).   3. Start using tretinoin 0.025% cream at nighttime before bed (see instructions about topical retinoids below).   4. Start using an over-the-counter moisturizer such as Vanicream, CeraVe, or Cetaphil.     Cyst on central chest.   1. Referral placed for excision by dermatology surgery. You should get a phone call about this.     Topical Retinoids    What are topical retinoids?    These are medicines that are related to Vitamin A. They are used on the skin.    Retin-A , Renova , Differin , and Tazorac  are brand names.    Come in creams and clear gels    Used to treat skin conditions like pimples (acne), face wrinkling, or dark-colored sunspots    How do I use these medicines?    Wash face and let dry for 15 to 30 minutes.    Use a large pea-size amount of medicine to cover the whole face. Do not put on close to the eyes and lips. Rub in gently.     Start by using every other day. If you have no irritation after a few days, start to use it daily.     You might have too much irritation with daily use. Use it less often until the irritation goes away. Then try to increase slowly to daily use.     Irritation improves over time.    You may use moisturizer if your skin becomes dry. Look for  non-comedogenic  (non-pore plugging) and oil free products.     What are the side effects?    Dryness     Peeling and flaking     Irritation of the skin     Possible increased chance of sunburns. Protect your skin from sunlight. Wear a hat and use a sunscreen  with SPF 30 or higher. Your sunscreen should have both UVA and UVB (broad-spectrum) protection.    Who should I call with questions?    [facility]: [phone]     [facility]: [phone]    For urgent needs outside of business hours call the [facility] at [phone] and ask for the dermatology resident on call      When should I call my doctor?    If you are worsening or not improving, please, contact us or seek urgent care as noted below.     Who should I call with questions (adults)?    [facility] (adult and pediatric): [phone]     [facility] (adult): [phone]    For urgent needs outside of business hours call the [facility] at [phone] and ask for the dermatology resident on call    If this is a medical emergency and you are unable to reach an ER, Call [phone]      Who should I call with questions (pediatric)?  [facility]- Pediatric Dermatology  Dr. Aida Astudillo, Dr. Papo Crow, Dr. Saima Gaines, Leticia Branhamnhmounika, [place]  Dr. Esther Chaidez, Dr. Yary Samuel & Dr. Franklin Means  Non Urgent  Nurse Triage Line; [phone]- Keerthi and Milana BETANCOURT Care Coordinators   Lesli (/Complex ) [phone]    If you need a prescription refill, please contact your pharmacy. Refills are approved or denied by our Physicians during normal business hours, Monday through Fridays  Per office policy, refills will not be granted if you have not been seen within the past year (or sooner depending on your child's condition)    Scheduling Information:  Pediatric Appointment Scheduling and Call Center [phone]  Radiology Scheduling- [phone]  Sedation Unit Scheduling- [phone]  [place] Scheduling- General [phone]; Pediatric Dermatology [phone]  Main  Services: [phone]  Latvian:  [phone]  Polish: [phone]  Ermias/Hungarian/Swapnil: [phone]  Preadmission Nursing Department Fax Number: [phone] (Fax all pre-operative paperwork to this number)    For urgent matters arising during evenings, weekends, or holidays that cannot wait for normal business hours please call [phone] and ask for the Dermatology Resident On-Call to be paged.

## 2020-12-28 ENCOUNTER — TELEPHONE (OUTPATIENT)
Dept: DERMATOLOGY | Facility: CLINIC | Age: 45
End: 2020-12-28

## 2020-12-28 NOTE — TELEPHONE ENCOUNTER
PA Initiation    Medication: tretinoin (RETIN-A) 0.025 % external cream   Insurance Company: [employer] - Phone [phone] Fax [phone]  Pharmacy Filling the Rx: [id number] [place], [place] - [facility] [address] AT [facility] & [address] [place] [address]  Filling Pharmacy Phone: [phone]  Filling Pharmacy Fax: [phone]  Start Date: 12/28/2020

## 2020-12-28 NOTE — TELEPHONE ENCOUNTER
PRIOR AUTHORIZATION DENIED    Medication: tretinoin (RETIN-A) 0.025 % external cream--DENIED    Denial Date: 12/28/2020    Denial Rational: Diagnosis is considered a cosmetic use which is excluded from coverage.     Appeal Information:

## 2020-12-28 NOTE — TELEPHONE ENCOUNTER
Prior Authorization Retail Medication Request    Medication/Dose: Tretinoin Cream   ICD code (if different than what is on RX):  [R23.8]   Previously Tried and Failed:  See chart  Rationale:      Insurance Name:  [facility]   Insurance ID:  [id number]        Pharmacy Information (if different than what is on RX)  Name:  [facility]   Phone: [phone]

## 2021-01-10 ENCOUNTER — MYC MEDICAL ADVICE (OUTPATIENT)
Dept: ORTHOPEDICS | Facility: CLINIC | Age: 46
End: 2021-01-10

## 2021-01-10 DIAGNOSIS — M54.2 NECK PAIN: Primary | ICD-10-CM

## 2021-01-11 RX ORDER — CYCLOBENZAPRINE HCL 5 MG
5 TABLET ORAL 3 TIMES DAILY PRN
Qty: 42 TABLET | Refills: 1 | Status: SHIPPED | OUTPATIENT
Start: 2021-01-11 | End: 2021-08-12

## 2021-01-17 ASSESSMENT — ENCOUNTER SYMPTOMS
CONSTIPATION: 0
HEADACHES: 1
HEMATOCHEZIA: 0
HEMATURIA: 0
CHILLS: 0
NERVOUS/ANXIOUS: 0
DIZZINESS: 0
FEVER: 0
WEAKNESS: 0
SHORTNESS OF BREATH: 0
DYSURIA: 0
COUGH: 0
ABDOMINAL PAIN: 0
PARESTHESIAS: 0
DIARRHEA: 0
NAUSEA: 0
SORE THROAT: 0
JOINT SWELLING: 0
MYALGIAS: 0
ARTHRALGIAS: 0
FREQUENCY: 0
EYE PAIN: 0
HEARTBURN: 0
PALPITATIONS: 0
BREAST MASS: 0

## 2021-01-21 ENCOUNTER — OFFICE VISIT (OUTPATIENT)
Dept: FAMILY MEDICINE | Facility: CLINIC | Age: 46
End: 2021-01-21
Payer: COMMERCIAL

## 2021-01-21 VITALS
HEART RATE: 79 BPM | OXYGEN SATURATION: 98 % | HEIGHT: 66 IN | TEMPERATURE: 98.4 F | DIASTOLIC BLOOD PRESSURE: 71 MMHG | RESPIRATION RATE: 18 BRPM | WEIGHT: 190.25 LBS | SYSTOLIC BLOOD PRESSURE: 107 MMHG | BODY MASS INDEX: 30.58 KG/M2

## 2021-01-21 DIAGNOSIS — Z00.00 ROUTINE GENERAL MEDICAL EXAMINATION AT A HEALTH CARE FACILITY: Primary | ICD-10-CM

## 2021-01-21 DIAGNOSIS — Z30.41 SURVEILLANCE OF PREVIOUSLY PRESCRIBED CONTRACEPTIVE PILL: ICD-10-CM

## 2021-01-21 DIAGNOSIS — E66.811 OBESITY (BMI 30.0-34.9): ICD-10-CM

## 2021-01-21 DIAGNOSIS — Z13.6 CARDIOVASCULAR SCREENING; LDL GOAL LESS THAN 160: ICD-10-CM

## 2021-01-21 DIAGNOSIS — R73.01 ELEVATED FASTING GLUCOSE: ICD-10-CM

## 2021-01-21 DIAGNOSIS — L08.89 PITTED KERATOLYSIS: ICD-10-CM

## 2021-01-21 DIAGNOSIS — F51.01 PRIMARY INSOMNIA: ICD-10-CM

## 2021-01-21 DIAGNOSIS — Z11.59 ENCOUNTER FOR HEPATITIS C SCREENING TEST FOR LOW RISK PATIENT: ICD-10-CM

## 2021-01-21 DIAGNOSIS — E55.9 VITAMIN D DEFICIENCY: ICD-10-CM

## 2021-01-21 DIAGNOSIS — Z13.1 SCREENING FOR DIABETES MELLITUS: ICD-10-CM

## 2021-01-21 DIAGNOSIS — Z11.4 SCREENING FOR HIV (HUMAN IMMUNODEFICIENCY VIRUS): ICD-10-CM

## 2021-01-21 PROBLEM — Z97.5 IUD (INTRAUTERINE DEVICE) IN PLACE: Status: ACTIVE | Noted: 2017-12-08

## 2021-01-21 LAB
DEPRECATED CALCIDIOL+CALCIFEROL SERPL-MC: 16 UG/L (ref 20–75)
HCV AB SERPL QL IA: NONREACTIVE
HIV 1+2 AB+HIV1 P24 AG SERPL QL IA: NONREACTIVE

## 2021-01-21 PROCEDURE — 99213 OFFICE O/P EST LOW 20 MIN: CPT | Mod: 25 | Performed by: FAMILY MEDICINE

## 2021-01-21 PROCEDURE — 99396 PREV VISIT EST AGE 40-64: CPT | Performed by: FAMILY MEDICINE

## 2021-01-21 PROCEDURE — 87389 HIV-1 AG W/HIV-1&-2 AB AG IA: CPT | Performed by: FAMILY MEDICINE

## 2021-01-21 PROCEDURE — 36415 COLL VENOUS BLD VENIPUNCTURE: CPT | Performed by: FAMILY MEDICINE

## 2021-01-21 PROCEDURE — 82306 VITAMIN D 25 HYDROXY: CPT | Performed by: FAMILY MEDICINE

## 2021-01-21 PROCEDURE — 82947 ASSAY GLUCOSE BLOOD QUANT: CPT | Performed by: FAMILY MEDICINE

## 2021-01-21 PROCEDURE — 86803 HEPATITIS C AB TEST: CPT | Performed by: FAMILY MEDICINE

## 2021-01-21 PROCEDURE — 80061 LIPID PANEL: CPT | Performed by: FAMILY MEDICINE

## 2021-01-21 RX ORDER — TRAZODONE HYDROCHLORIDE 50 MG/1
50 TABLET, FILM COATED ORAL AT BEDTIME
Qty: 30 TABLET | Refills: 3 | Status: SHIPPED | OUTPATIENT
Start: 2021-01-21 | End: 2021-09-24

## 2021-01-21 RX ORDER — ACETAMINOPHEN AND CODEINE PHOSPHATE 120; 12 MG/5ML; MG/5ML
0.35 SOLUTION ORAL DAILY
Qty: 84 TABLET | Refills: 3 | Status: SHIPPED | OUTPATIENT
Start: 2021-01-21 | End: 2021-08-05

## 2021-01-21 RX ORDER — ALUMINUM CHLORIDE 20 %
SOLUTION, NON-ORAL TOPICAL AT BEDTIME
Qty: 35 ML | Refills: 1 | Status: SHIPPED | OUTPATIENT
Start: 2021-01-21 | End: 2021-07-30

## 2021-01-21 RX ORDER — CLINDAMYCIN PHOSPHATE 10 MG/G
GEL TOPICAL
Qty: 60 G | Refills: 1 | Status: SHIPPED | OUTPATIENT
Start: 2021-01-21

## 2021-01-21 ASSESSMENT — ENCOUNTER SYMPTOMS
NERVOUS/ANXIOUS: 0
JOINT SWELLING: 0
HEMATURIA: 0
HEADACHES: 1
PARESTHESIAS: 0
FEVER: 0
FREQUENCY: 0
WEAKNESS: 0
CHILLS: 0
ABDOMINAL PAIN: 0
SHORTNESS OF BREATH: 0
SORE THROAT: 0
PALPITATIONS: 0
MYALGIAS: 0
CONSTIPATION: 0
DIZZINESS: 0
COUGH: 0
DYSURIA: 0
HEMATOCHEZIA: 0
BREAST MASS: 0
HEARTBURN: 0
NAUSEA: 0
EYE PAIN: 0
DIARRHEA: 0
ARTHRALGIAS: 0

## 2021-01-21 ASSESSMENT — MIFFLIN-ST. JEOR: SCORE: 1526.78

## 2021-01-21 NOTE — PATIENT INSTRUCTIONS
Preventive Health Recommendations  Female Ages 40 to 49    Yearly exam:     See your health care provider every year in order to  1. Review health changes.   2. Discuss preventive care.    3. Review your medicines if your doctor prescribed any.      Get a Pap test every 5 years.  Lab Results   Component Value Date    PAP NIL 04/02/2019    PAP NIL 01/27/2015    PAP NIL 10/10/2012         You should be tested each year for STDs (sexually transmitted diseases), if you're at risk.     Ask your doctor if you should have a mammogram.      Have a colonoscopy (test for colon cancer) if someone in your family has had colon cancer or polyps before age 50.       Have a cholesterol test every 5 years.       Have a diabetes test (fasting glucose) after age 45. If you are at risk for diabetes, you should have this test every 3 years.    Shots: Get a flu shot each year. Get a tetanus shot every 10 years.     Nutrition:     Eat at least 5 servings of fruits and vegetables each day.    Eat whole-grain bread, whole-wheat pasta and brown rice instead of white grains and rice.    Get adequate Calcium and Vitamin D.      Lifestyle    Exercise at least 150 minutes a week (an average of 30 minutes a day, 5 days a week). This will help you control your weight and prevent disease.    Limit alcohol to one drink per day.    No smoking.     Wear sunscreen to prevent skin cancer.    See your dentist every six months for an exam and cleaning.

## 2021-01-21 NOTE — PROGRESS NOTES
SUBJECTIVE:   CC: Ruthie Nash is an 45 year old woman who presents for preventive health visit.     {Split Bill scripting  The purpose of this visit is to discuss your medical history and prevent health problems before you are sick. You may be responsible for a co-pay, coinsurance, or deductible if your visit today includes services such as checking on a sore throat, having an x-ray or lab test, or treating and evaluating a new or existing condition   Patient has been advised of split billing requirements and indicates understanding: Yes     Healthy Habits:     Getting at least 3 servings of Calcium per day:  Yes    Bi-annual eye exam:  Yes    Dental care twice a year:  Yes    Sleep apnea or symptoms of sleep apnea:  None    Diet:  Regular (no restrictions)    Frequency of exercise:  2-3 days/week    Duration of exercise:  15-30 minutes    Taking medications regularly:  Yes    Medication side effects:  None    PHQ-2 Total Score: 0    Additional concerns today:  Yes  Derm Concern: bump on sternum       Insomnia      Duration: on and off for years, but worse recently despite trying to use good sleep hygiene, probably due to stress of covid and pending move.    A friend of hers recommended trazodone and she'd like to try this.    She does have a past hx of anxiety    Today's PHQ-2 Score:   PHQ-2 ( 1999 Pfizer) 1/17/2021   Q1: Little interest or pleasure in doing things 0   Q2: Feeling down, depressed or hopeless 0   PHQ-2 Score 0   Q1: Little interest or pleasure in doing things Not at all   Q2: Feeling down, depressed or hopeless Not at all   PHQ-2 Score 0       Abuse: Current or Past (Physical, Sexual or Emotional) - No  Do you feel safe in your environment? Yes        Social History     Tobacco Use     Smoking status: Never Smoker     Smokeless tobacco: Never Used   Substance Use Topics     Alcohol use: Yes     Frequency: Monthly or less     Drinks per session: 1 or 2     Binge frequency: Never     Comment: 3  1)  Use Imodium as needed.  2)  Schedule sigmoidoscopy.   days a week a beer/whiteclaw or a glass of wine         Alcohol Use 1/17/2021   Prescreen: >3 drinks/day or >7 drinks/week? No   Prescreen: >3 drinks/day or >7 drinks/week? -       Reviewed orders with patient.  Reviewed health maintenance and updated orders accordingly - Yes  BP Readings from Last 3 Encounters:   01/21/21 107/71   08/07/19 (!) 134/91   06/21/19 114/80    Wt Readings from Last 3 Encounters:   01/21/21 86.3 kg (190 lb 4 oz)   06/21/19 81.6 kg (180 lb)   04/02/19 87.5 kg (193 lb)                  Current Outpatient Medications   Medication Sig Dispense Refill     Acetaminophen (TYLENOL PO) Take 325-650 mg by mouth every 4 hours as needed for mild pain or fever       aluminum chloride (DRYSOL) 20 % external solution Apply topically At Bedtime Apply to dry skin at bedtime once daily, wash off in morning. 35 mL 1     clindamycin (CLINDAGEL) 1 % external gel Apply small amount to soles of feet nightly 60 g 1     naratriptan (AMERGE) 2.5 MG tablet Take 1 tablet (2.5 mg) by mouth at onset of headache for migraine (repeat in 4 hours if needed) May repeat in 4 hours. Max 2 tablets/24 hours. 18 tablet 3     norethindrone (MICRONOR) 0.35 MG tablet Take 1 tablet (0.35 mg) by mouth daily 84 tablet 3     Omega-3 Fatty Acids (FISH OIL PO)        Prenatal Multivit-Min-Fe-FA (PRENATAL VITAMINS PO)        propranolol ER (INDERAL LA) 80 MG 24 hr capsule Take 1 capsule (80 mg) by mouth 2 times daily 180 capsule 3     traZODone (DESYREL) 50 MG tablet Take 1 tablet (50 mg) by mouth At Bedtime 30 tablet 3     tretinoin (RETIN-A) 0.025 % external cream Apply a pea-sized amount evenly over face at nighttime before bed. 45 g 11     VITAMIN D, CHOLECALCIFEROL, PO Take by mouth daily       cyclobenzaprine (FLEXERIL) 5 MG tablet Take 1 tablet (5 mg) by mouth 3 times daily as needed for muscle spasms (Patient not taking: Reported on 1/21/2021) 42 tablet 1     propranolol (INDERAL) 20 MG tablet Take 1 tablet (20 mg) by mouth 2  times daily (Patient not taking: Reported on 2021) 180 tablet 0     Allergies   Allergen Reactions     Birch Trees      Dust Mites      Hay Fever & [A.R.M.]      Mold      No lab results found.     Mammogram Screening: Patient over age 50, mutual decision to screen reflected in health maintenance.    Pertinent mammograms are reviewed under the imaging tab.  History of abnormal Pap smear: NO - age 30-65 PAP every 5 years with negative HPV co-testing recommended  PAP / HPV Latest Ref Rng & Units 2019 2015 10/10/2012   PAP - NIL NIL NIL   HPV 16 DNA NEG:Negative Negative - -   HPV 18 DNA NEG:Negative Negative - -   OTHER HR HPV NEG:Negative Negative - -     Reviewed and updated as needed this visit by clinical staff  Tobacco  Allergies  Meds  Problems  Med Hx  Surg Hx  Fam Hx  Soc Hx          Reviewed and updated as needed this visit by Provider     Problems            Past Medical History:   Diagnosis Date     Body aches      Chronic headache      LENORE (generalised anxiety disorder) 2015     Headache      Insomnia 12/10/2008     LSIL (low grade squamous intraepithelial lesion) on Pap smear     also +HPV, colps WNL x two      Past Surgical History:   Procedure Laterality Date     ORTHOPEDIC SURGERY  2011    Left Arm     OB History    Para Term  AB Living   4 3 3 0 1 3   SAB TAB Ectopic Multiple Live Births   0 0 0 0 3      # Outcome Date GA Lbr Kayden/2nd Weight Sex Delivery Anes PTL Lv   4 Term 16    M    MAAME   3 Term 13    F    MAAME   2 Term 11 40w0d   M    MAAME      Name: Rachid Arenas AB               Birth Comments: first trimester       Review of Systems   Constitutional: Negative for chills and fever.   HENT: Negative for congestion, ear pain, hearing loss and sore throat.    Eyes: Negative for pain and visual disturbance.   Respiratory: Negative for cough and shortness of breath.    Cardiovascular: Negative for chest pain, palpitations and  "peripheral edema.   Gastrointestinal: Negative for abdominal pain, constipation, diarrhea, heartburn, hematochezia and nausea.   Breasts:  Negative for tenderness, breast mass and discharge.   Genitourinary: Negative for dysuria, frequency, genital sores, hematuria, pelvic pain, urgency, vaginal bleeding and vaginal discharge.   Musculoskeletal: Negative for arthralgias, joint swelling and myalgias.   Skin: Negative for rash.   Neurological: Positive for headaches. Negative for dizziness, weakness and paresthesias.   Psychiatric/Behavioral: Negative for mood changes. The patient is not nervous/anxious.      CONSTITUTIONAL: NEGATIVE for fever, chills, change in weight  INTEGUMENTARU/SKIN: NEGATIVE for worrisome rashes, moles or lesions  EYES: NEGATIVE for vision changes or irritation  ENT: NEGATIVE for ear, mouth and throat problems  RESP: NEGATIVE for significant cough or SOB  BREAST: NEGATIVE for masses, tenderness or discharge  CV: NEGATIVE for chest pain, palpitations or peripheral edema  GI: NEGATIVE for nausea, abdominal pain, heartburn, or change in bowel habits  : NEGATIVE for unusual urinary or vaginal symptoms. Periods are regular.  MUSCULOSKELETAL: NEGATIVE for significant arthralgias or myalgia  NEURO: NEGATIVE for weakness, dizziness or paresthesias  PSYCHIATRIC: NEGATIVE for changes in mood or affect     OBJECTIVE:   /71   Pulse 79   Temp 98.4  F (36.9  C) (Tympanic)   Resp 18   Ht 1.664 m (5' 5.5\")   Wt 86.3 kg (190 lb 4 oz)   SpO2 98%   BMI 31.18 kg/m    Physical Exam  GENERAL: healthy, alert and no distress  NECK: no adenopathy, no asymmetry, masses, or scars and thyroid normal to palpation  RESP: lungs clear to auscultation - no rales, rhonchi or wheezes  CV: regular rate and rhythm, normal S1 S2, no S3 or S4, no murmur, click or rub, no peripheral edema and peripheral pulses strong  ABDOMEN: soft, nontender, no hepatosplenomegaly, no masses and bowel sounds normal  MS: no gross " "musculoskeletal defects noted, no edema    Diagnostic Test Results:  Labs reviewed in Epic    ASSESSMENT/PLAN:   1. Routine general medical examination at a health care facility  routine    2. Primary insomnia  Acute exacerbation, start medication as below  - traZODone (DESYREL) 50 MG tablet; Take 1 tablet (50 mg) by mouth At Bedtime  Dispense: 30 tablet; Refill: 3    3. Pitted keratolysis  Refilled chronic medications today  - aluminum chloride (DRYSOL) 20 % external solution; Apply topically At Bedtime Apply to dry skin at bedtime once daily, wash off in morning.  Dispense: 35 mL; Refill: 1  - clindamycin (CLINDAGEL) 1 % external gel; Apply small amount to soles of feet nightly  Dispense: 60 g; Refill: 1    4. Surveillance of previously prescribed contraceptive pill  - norethindrone (MICRONOR) 0.35 MG tablet; Take 1 tablet (0.35 mg) by mouth daily  Dispense: 84 tablet; Refill: 3    5. CARDIOVASCULAR SCREENING; LDL GOAL LESS THAN 160  - Lipid panel reflex to direct LDL Fasting    6. Screening for diabetes mellitus  - Glucose    7. Screening for HIV (human immunodeficiency virus)  - HIV Antigen Antibody Combo    8. Encounter for hepatitis C screening test for low risk patient  - Hepatitis C Screen Reflex to HCV RNA Quant and Genotype    9. Vitamin D deficiency  - Vitamin D Deficiency    COUNSELING:  Reviewed preventive health counseling, as reflected in patient instructions    Estimated body mass index is 31.18 kg/m  as calculated from the following:    Height as of this encounter: 1.664 m (5' 5.5\").    Weight as of this encounter: 86.3 kg (190 lb 4 oz).    Weight management plan: Discussed healthy diet and exercise guidelines    She reports that she has never smoked. She has never used smokeless tobacco.      Counseling Resources:  ATP IV Guidelines  Pooled Cohorts Equation Calculator  Breast Cancer Risk Calculator  BRCA-Related Cancer Risk Assessment: FHS-7 Tool  FRAX Risk Assessment  ICSI Preventive " Guidelines  Dietary Guidelines for Americans, 2010  USDA's MyPlate  ASA Prophylaxis  Lung CA Screening    Julia Bashir MD  [facility]

## 2021-01-22 LAB
CHOLEST SERPL-MCNC: 234 MG/DL
GLUCOSE SERPL-MCNC: 116 MG/DL (ref 70–99)
HDLC SERPL-MCNC: 54 MG/DL
LDLC SERPL CALC-MCNC: 156 MG/DL
NONHDLC SERPL-MCNC: 180 MG/DL
TRIGL SERPL-MCNC: 120 MG/DL

## 2021-01-26 RX ORDER — ERGOCALCIFEROL 1.25 MG/1
50000 CAPSULE, LIQUID FILLED ORAL WEEKLY
Qty: 12 CAPSULE | Refills: 0 | Status: SHIPPED | OUTPATIENT
Start: 2021-01-26 | End: 2021-04-14

## 2021-01-26 NOTE — RESULT ENCOUNTER NOTE
"Thank you for getting labs done!  It's always great to see you and stay in touch!    Your hiv test was negative for infection, you do NOT have this disease.     Your screening test was negative for Hepatitis C, which is great news! You have NOT been infected with this liver disease.  You do not need any further testing for Hepatitis C.     Your vitamin D level is low.    As you may know, vitamin D deficiency is associated with many medical problems including osteoporosis, muscles aches and pains, weakness and falls.  Maintaining adequate levels is very important for good health.  During winter months (October through March), people in northern climates are not able to synthesize vitamin D from sunlight and therefore have higher rates of deficiency due to inadquate oral intake. Vitamin D deficiency is very common in [place]!    I recommend taking a high dose supplement for 3 months, 50,000 units once per week.  You could consider rechecking your level when you have finished this course. I will send a prescription to your pharmacy.     After you are done with the high dose,  I recommend taking a daily supplement of 2000 units daily probably year around, but most importantly from October to March.     Your fasting glucose test was elevated.  A normal value is under 100, although this has changed, as prior \"normal\" fasting was considered less than 126 (which you are). If you'd like to, we could do a more specific blood test called the HgAIC (hemoglobin A1c, or glycosylated hemoglobin) which is basically the average of your blood sugars over time.    You do NOT need to be fasting for this test. If you'd like this done, schedule a lab appointment anytime.     Your cholesterol is slightly abnormal. Your total cholesterol is a little high but everything else looks great, your LD and HDL are at goal and your triglycerides are nice and low! This combination means that your overall risk of heart disease or stroke is very " low, although if you'd like to try to lower your cholesterol, there are some ideas below.    Desired or goal levels are:  CHOLESTEROL: Desirable is less than 200.  HDL (Good Cholesterol): Desirable is greater than 40 in men and greater than 50 in women.  LDL (Bad Cholesterol): Desirable is less than 160.  TRIGLYCERIDES: Desirable is less than 150.    As you may know, abnormal cholesterol is one factor that increases your risk for heart disease and stroke. You can improve your cholesterol by controlling the amount and type of fat you eat and by increasing your daily activity level.    Here are some ways to improve your nutrition (adapted from the American Academy of [employer] handout):  Eat less fat (especially butter, Crisco and other saturated fats)  Buy lean cuts of meat; reduce your portions of red meat or substitute poultry or fish  Use skim milk and low-fat dairy products  Eat no more than 4 egg yolks per week  Avoid fried or fast foods that are high in fat  Eat more fruits and vegetables    If you have any questions, please contact the clinic or schedule an appointment with me, thank you!    Sincerely,    Julia Bashir MD

## 2021-02-02 ASSESSMENT — PATIENT HEALTH QUESTIONNAIRE - PHQ9: SUM OF ALL RESPONSES TO PHQ QUESTIONS 1-9: 3

## 2021-05-14 NOTE — TELEPHONE ENCOUNTER
"I sent the following my chart message. Nothing further to do. Note closed.  Sincerely,    Julia Bashir   6/29/2018      Hi, probably the place to start is with an OB/Gyn, would you like a referral for an gynecologist?  I put both referrals in, but I think an Ob/gyn would be the most helpful.  You could also get some preliminary lab tests at [facility] (blood count, hormone levels, thyroid) to start ruling out things if you like, I put orders in, so you could schedule a lab appointment anytime.    I referred you to several OB offices below, you can choose the most convenient location:    [facility]: [facility] [phone]   [email or web address]    [facility]: [facility] [phone]   [email or web address]    [facility]: [facility]   [place] [phone]   [email or web address]  [place] [phone]   [email or web address]      And just in case here's the endocrinology office:  [facility] [place] [phone]       Sincerely,  Dr. Julia Bashir MD  6/29/2018    ===View-only below this line===      ----- Message -----     From: Ruthie Nash     Sent: 6/28/2018 12:33 PM CDT       To: Julia Bashir MD  Subject: Updates about my health    I am still getting my period with the IUD and the pill. During the period weeks the headaches, insomnia and general feeling of being \"off\" and \"foggy\" continue. May I have a referral to see an endocrinologist?     I was thinking of seeing someone at the . I looked through the staff page and didn't see anyone that specialized in menstrual dysfunction (if that's what this is), but it's close. If you know of anyone specifically you can recommend that would be appreciated. Thanks.     "
Patient is requesting endocrine referral.  Debbi Zhao RN  
[facility]

## 2021-06-02 ENCOUNTER — RECORDS - HEALTHEAST (OUTPATIENT)
Dept: ADMINISTRATIVE | Facility: CLINIC | Age: 46
End: 2021-06-02

## 2021-07-08 ENCOUNTER — TELEPHONE (OUTPATIENT)
Dept: DERMATOLOGY | Facility: CLINIC | Age: 46
End: 2021-07-08

## 2021-07-08 NOTE — TELEPHONE ENCOUNTER
Called and left message for patient to call back and reschedule August appointment.    Neri Collins, Facilitator

## 2021-07-28 DIAGNOSIS — L08.89 PITTED KERATOLYSIS: ICD-10-CM

## 2021-07-30 RX ORDER — ALUMINUM CHLORIDE 20 %
SOLUTION, NON-ORAL TOPICAL
Qty: 35 ML | Refills: 1 | Status: SHIPPED | OUTPATIENT
Start: 2021-07-30 | End: 2021-08-05

## 2021-08-04 ENCOUNTER — VIRTUAL VISIT (OUTPATIENT)
Dept: NEUROLOGY | Facility: CLINIC | Age: 46
End: 2021-08-04
Payer: COMMERCIAL

## 2021-08-04 DIAGNOSIS — G43.909 MIGRAINE WITHOUT STATUS MIGRAINOSUS, NOT INTRACTABLE, UNSPECIFIED MIGRAINE TYPE: ICD-10-CM

## 2021-08-04 PROCEDURE — 99214 OFFICE O/P EST MOD 30 MIN: CPT | Mod: 95 | Performed by: PSYCHIATRY & NEUROLOGY

## 2021-08-04 RX ORDER — ONDANSETRON 4 MG/1
4 TABLET, FILM COATED ORAL EVERY 8 HOURS PRN
Qty: 20 TABLET | Refills: 11 | Status: SHIPPED | OUTPATIENT
Start: 2021-08-04

## 2021-08-04 RX ORDER — PROPRANOLOL HYDROCHLORIDE 80 MG/1
80 CAPSULE, EXTENDED RELEASE ORAL 2 TIMES DAILY
Qty: 180 CAPSULE | Refills: 3 | Status: SHIPPED | OUTPATIENT
Start: 2021-08-04

## 2021-08-04 NOTE — PROGRESS NOTES
Ruthie is a 46 year old who is being evaluated via a billable video visit.      How would you like to obtain your AVS? MyChart  If the video visit is dropped, the invitation should be resent by: Text to cell phone: [phone]  Will anyone else be joining your video visit? No      Video Start Time: 9:32  Video-Visit Details    Type of service:  Video Visit    Video End Time:9:42 AM    Originating Location (pt. Location): Home    Distant Location (provider location):  [facility] [place]     Platform used for Video Visit: Kate     MIGRAINE DISABILITY ASSESSMENT (MIDAS)    On how many days in the last 3 months did you miss work or school because of your headaches?  0    How many days in the last 3 months was your productivity at work or school reduced by half or more because of your headaches? (Do not include days you counted in question 1 where you missed work or school.)  10    On how many days in the last 3 months did you not do household work (such as housework, home repairs and maintenance, shopping, caring for children and relatives) because of your headaches?  0    How many days in the last 3 months was your productivity in household work reduced by half or more because of your headaches? (Do not include days you counted in question 3 where you did not do household work).  0    On how many days in the last 3 months did you miss family, social, or lesiure activities because of your headaches?  4    MIDAS Total Score:     On how many days in the last 3 months did you have a headache? (If a headache lasted more than 1 day, count each day.)   14    On a scale of 0 - 10, on average how painful were these headaches (where 0 = no pain at all, and 10 = pain as bad as it can be.)  7    [facility]  Neurology Progress Note    Subjective:    Ms. Nash returns for follow up of migraine. I last saw her one year ago. At that time, she  "continued propranolol ER for prevention and I recommended she try naratriptan for acute treatment.    Today, she reports she had a few months that were \"better\". She had two bad weeks recently; she wonders if returning to full time work triggered worsening. No significant changes in migraine attacks.    On average, she has 4-5 migraine attacks per month, half of which are severe. She has associated nausea.     She continues to use flexeril as needed for neck pain. This can contribute to headaches.     Objective:    Vitals: There were no vitals taken for this visit.  General: Cooperative, NAD  Neurologic:  Mental Status: Fully alert, attentive and oriented. Speech clear and fluent.   Cranial Nerves: Facial movements symmetric.   Motor: No abnormal movements.      Assessment/Plan:   Ruthie Nash is a 46-year-old woman who returns for follow-up of migraine.  She has been relatively stable, outside of two bad weeks recently.     We will continue her current treatment strategy of propranolol 80 mg twice daily.  She prefers the extended release formula.  I renewed this for her.     For acute treatment of headache, she had significant side effects with rizatriptan, including sleepiness. She will continue with naratriptan 2.5 mg. Otherwise, she will continue ibuprofen for more mild attacks.    For nausea related to headache, I recommend ondansetron 4 mg as needed.    I will see her back in 1 year. She is selling her home in [place] and planning to live in [place]. If she is not returning to [place], we are happy to facilitate transfer of records to her new physician.    Evi Goodson MD  Neurology     "

## 2021-08-04 NOTE — LETTER
"8/4/2021       RE: Ruthie Nash  [id number] [address] [place] [id number]     Dear Colleague,    Thank you for referring your patient, Ruthie Nash, to the [facility] [place] [facility]. Please see a copy of my visit note below.    MIGRAINE DISABILITY ASSESSMENT (MIDAS)    On how many days in the last 3 months did you miss work or school because of your headaches?  0    How many days in the last 3 months was your productivity at work or school reduced by half or more because of your headaches? (Do not include days you counted in question 1 where you missed work or school.)  10    On how many days in the last 3 months did you not do household work (such as housework, home repairs and maintenance, shopping, caring for children and relatives) because of your headaches?  0    How many days in the last 3 months was your productivity in household work reduced by half or more because of your headaches? (Do not include days you counted in question 3 where you did not do household work).  0    On how many days in the last 3 months did you miss family, social, or lesiure activities because of your headaches?  4    MIDAS Total Score:     On how many days in the last 3 months did you have a headache? (If a headache lasted more than 1 day, count each day.)   14    On a scale of 0 - 10, on average how painful were these headaches (where 0 = no pain at all, and 10 = pain as bad as it can be.)  7    [facility]    [facility]inics and [facility]  Neurology Progress Note    Subjective:    Ms. Nash returns for follow up of migraine. I last saw her one year ago. At that time, she continued propranolol ER for prevention and I recommended she try naratriptan for acute treatment.    Today, she reports she had a few months that were \"better\". She had two bad weeks recently; she wonders if returning to full time work triggered worsening. " No significant changes in migraine attacks.    On average, she has 4-5 migraine attacks per month, half of which are severe. She has associated nausea.     She continues to use flexeril as needed for neck pain. This can contribute to headaches.     Objective:    Vitals: There were no vitals taken for this visit.  General: Cooperative, NAD  Neurologic:  Mental Status: Fully alert, attentive and oriented. Speech clear and fluent.   Cranial Nerves: Facial movements symmetric.   Motor: No abnormal movements.      Assessment/Plan:   Ruthie Nash is a 46-year-old woman who returns for follow-up of migraine.  She has been relatively stable, outside of two bad weeks recently.     We will continue her current treatment strategy of propranolol 80 mg twice daily.  She prefers the extended release formula.  I renewed this for her.     For acute treatment of headache, she had significant side effects with rizatriptan, including sleepiness. She will continue with naratriptan 2.5 mg. Otherwise, she will continue ibuprofen for more mild attacks.    For nausea related to headache, I recommend ondansetron 4 mg as needed.    I will see her back in 1 year. She is selling her home in [place] and planning to live in [place]. If she is not returning to [place], we are happy to facilitate transfer of records to her new physician.    Evi Goodson MD  Neurology       Again, thank you for allowing me to participate in the care of your patient.      Sincerely,  Evi Goodson MD

## 2021-08-05 ENCOUNTER — OFFICE VISIT (OUTPATIENT)
Dept: FAMILY MEDICINE | Facility: CLINIC | Age: 46
End: 2021-08-05
Payer: COMMERCIAL

## 2021-08-05 VITALS
HEART RATE: 68 BPM | OXYGEN SATURATION: 100 % | HEIGHT: 66 IN | TEMPERATURE: 97.7 F | RESPIRATION RATE: 16 BRPM | BODY MASS INDEX: 30.53 KG/M2 | WEIGHT: 190 LBS | SYSTOLIC BLOOD PRESSURE: 104 MMHG | DIASTOLIC BLOOD PRESSURE: 78 MMHG

## 2021-08-05 DIAGNOSIS — Z13.6 CARDIOVASCULAR SCREENING; LDL GOAL LESS THAN 160: ICD-10-CM

## 2021-08-05 DIAGNOSIS — F32.0 MILD MAJOR DEPRESSION (H): ICD-10-CM

## 2021-08-05 DIAGNOSIS — L08.89 PITTED KERATOLYSIS: ICD-10-CM

## 2021-08-05 DIAGNOSIS — Z30.41 SURVEILLANCE OF PREVIOUSLY PRESCRIBED CONTRACEPTIVE PILL: ICD-10-CM

## 2021-08-05 DIAGNOSIS — Z00.00 ROUTINE GENERAL MEDICAL EXAMINATION AT A HEALTH CARE FACILITY: Primary | ICD-10-CM

## 2021-08-05 DIAGNOSIS — R23.8 FACIAL AGING: ICD-10-CM

## 2021-08-05 DIAGNOSIS — Z13.1 SCREENING FOR DIABETES MELLITUS: ICD-10-CM

## 2021-08-05 PROBLEM — Z97.5 IUD (INTRAUTERINE DEVICE) IN PLACE: Status: ACTIVE | Noted: 2017-12-08

## 2021-08-05 LAB — HBA1C MFR BLD: 5.4 % (ref 0–5.6)

## 2021-08-05 PROCEDURE — 80061 LIPID PANEL: CPT | Performed by: FAMILY MEDICINE

## 2021-08-05 PROCEDURE — 99396 PREV VISIT EST AGE 40-64: CPT | Performed by: FAMILY MEDICINE

## 2021-08-05 PROCEDURE — 36415 COLL VENOUS BLD VENIPUNCTURE: CPT | Performed by: FAMILY MEDICINE

## 2021-08-05 PROCEDURE — 83036 HEMOGLOBIN GLYCOSYLATED A1C: CPT | Performed by: FAMILY MEDICINE

## 2021-08-05 RX ORDER — TRETINOIN 0.25 MG/G
CREAM TOPICAL
Qty: 45 G | Refills: 11 | Status: SHIPPED | OUTPATIENT
Start: 2021-08-05

## 2021-08-05 RX ORDER — ACETAMINOPHEN AND CODEINE PHOSPHATE 120; 12 MG/5ML; MG/5ML
0.35 SOLUTION ORAL DAILY
Qty: 84 TABLET | Refills: 3 | Status: SHIPPED | OUTPATIENT
Start: 2021-08-05

## 2021-08-05 RX ORDER — SERTRALINE HYDROCHLORIDE 25 MG/1
25 TABLET, FILM COATED ORAL DAILY
Qty: 90 TABLET | Refills: 3 | Status: SHIPPED | OUTPATIENT
Start: 2021-08-05

## 2021-08-05 RX ORDER — ALUMINUM CHLORIDE 20 %
SOLUTION, NON-ORAL TOPICAL
Qty: 60 ML | Refills: 4 | Status: SHIPPED | OUTPATIENT
Start: 2021-08-05

## 2021-08-05 ASSESSMENT — ENCOUNTER SYMPTOMS
BREAST MASS: 0
EYE PAIN: 0
SORE THROAT: 0
DYSURIA: 0
NAUSEA: 0
ARTHRALGIAS: 0
MYALGIAS: 0
SHORTNESS OF BREATH: 0
CONSTIPATION: 0
HEARTBURN: 0
CHILLS: 0
HEADACHES: 0
DIZZINESS: 0
ABDOMINAL PAIN: 0
JOINT SWELLING: 0
FEVER: 0
PARESTHESIAS: 0
PALPITATIONS: 0
HEMATOCHEZIA: 0
FREQUENCY: 0
WEAKNESS: 0
NERVOUS/ANXIOUS: 0
DIARRHEA: 0
HEMATURIA: 0
COUGH: 0

## 2021-08-05 ASSESSMENT — MIFFLIN-ST. JEOR: SCORE: 1520.64

## 2021-08-05 NOTE — PROGRESS NOTES
SUBJECTIVE:   CC: Ruthie Nash is an 46 year old woman who presents for preventive health visit.     Healthy Habits:     Getting at least 3 servings of Calcium per day:  Yes    Bi-annual eye exam:  Yes    Dental care twice a year:  Yes    Sleep apnea or symptoms of sleep apnea:  Daytime drowsiness    Diet:  Regular (no restrictions)    Frequency of exercise:  4-5 days/week    Duration of exercise:  15-30 minutes    Taking medications regularly:  Yes    Medication side effects:  None    PHQ-2 Total Score: 0    Additional concerns today:  Yes    Recent Labs   Lab Test 01/21/21  0756   CHOL 234*   HDL 54   *   TRIG 120       Depression Followup    How are you doing with your depression since your last visit? Newer diagnosis, move to [place], [facility] location, but school was not good for her daughter, so they're moving to a new school district for the next school year.    Are you having other symptoms that might be associated with depression? No    Have you had a significant life event?  OTHER: move to different state     Are you feeling anxious or having panic attacks?   No    Do you have any concerns with your use of alcohol or other drugs? No    Social History     Tobacco Use     Smoking status: Never Smoker     Smokeless tobacco: Never Used   Substance Use Topics     Alcohol use: Yes     Comment: 3 days a week a beer/whiteclaw or a glass of wine     Drug use: No     PHQ 2/2/2021   PHQ-9 Total Score 3   Q9: Thoughts of better off dead/self-harm past 2 weeks Not at all     LENORE-7 SCORE 10/11/2012 1/27/2015   Total Score 3 1       Suicide Assessment Five-step Evaluation and Treatment (SAFE-T)      Today's PHQ-2 Score:   PHQ-2 ( 1999 [employer]) 8/5/2021   Q1: Little interest or pleasure in doing things 0   Q2: Feeling down, depressed or hopeless 0   PHQ-2 Score 0   Q1: Little interest or pleasure in doing things Not at all   Q2: Feeling down, depressed or hopeless Not at all   PHQ-2 Score 0       Abuse: Current  or Past (Physical, Sexual or Emotional) - No  Do you feel safe in your environment? Yes    Have you ever done Advance Care Planning? (For example, a Health Directive, POLST, or a discussion with a medical provider or your loved ones about your wishes): Yes, patient states has an Advance Care Planning document and will bring a copy to the clinic.    Social History     Tobacco Use     Smoking status: Never Smoker     Smokeless tobacco: Never Used   Substance Use Topics     Alcohol use: Yes     Comment: 3 days a week a beer/whiteclaw or a glass of wine     If you drink alcohol do you typically have >3 drinks per day or >7 drinks per week? No    Alcohol Use 8/5/2021   Prescreen: >3 drinks/day or >7 drinks/week? No   Prescreen: >3 drinks/day or >7 drinks/week? -   No flowsheet data found.    Reviewed orders with patient.  Reviewed health maintenance and updated orders accordingly - Yes  BP Readings from Last 3 Encounters:   08/05/21 104/78   01/21/21 107/71   08/07/19 (!) 134/91    Wt Readings from Last 3 Encounters:   08/05/21 86.2 kg (190 lb)   01/21/21 86.3 kg (190 lb 4 oz)   06/21/19 81.6 kg (180 lb)                  Patient Active Problem List   Diagnosis     Insomnia     Atopic rhinitis     Adjustment disorder with anxiety     CARDIOVASCULAR SCREENING; LDL GOAL LESS THAN 160     Hyperhidrosis of feet     Pitted keratolysis     Skin tag     Pain in joint, upper arm     Aftercare for healing traumatic fracture of upper arm     Other postprocedural status(V45.89)     Low back pain     Lumbago     Pain in joint, pelvic region and thigh     Body aches     Vitamin D deficiency     Headache     Generalized anxiety disorder     Laryngitis     Cervicalgia     Migraine without aura and without status migrainosus, not intractable     Chronic intractable headache, unspecified headache type     Excessive or frequent menstruation     Trapezius muscle spasm     Obesity (BMI 30.0-34.9)     IUD (intrauterine device) in place      Mild major depression (H)     Past Surgical History:   Procedure Laterality Date     ORTHOPEDIC SURGERY  12/2011    Left Arm       Social History     Tobacco Use     Smoking status: Never Smoker     Smokeless tobacco: Never Used   Substance Use Topics     Alcohol use: Yes     Comment: 3 days a week a beer/whiteclaw or a glass of wine     Family History   Problem Relation Age of Onset     Allergies Mother      Hypertension Mother      Mental Illness Mother      Hypertension Father      Mental Illness Father      Family History Negative Paternal Grandmother      Heart Disease Maternal Grandmother      Obesity Maternal Grandmother      Diabetes Maternal Grandmother      Cancer Maternal Grandfather      Heart Disease Paternal Grandfather      Obesity Paternal Grandfather      Diabetes Paternal Grandfather      Family History Negative Sister      Family History Negative Brother      Family History Negative Brother          Current Outpatient Medications   Medication Sig Dispense Refill     Acetaminophen (TYLENOL PO) Take 325-650 mg by mouth every 4 hours as needed for mild pain or fever       aluminum chloride (DRYSOL) 20 % external solution APPLY TOPICALLY TO DRY SKIN EVERY NIGHT AT BEDTIME. WASH OFF IN MORNING 60 mL 4     clindamycin (CLINDAGEL) 1 % external gel Apply small amount to soles of feet nightly 60 g 1     cyclobenzaprine (FLEXERIL) 5 MG tablet Take 1 tablet (5 mg) by mouth 3 times daily as needed for muscle spasms 42 tablet 1     naratriptan (AMERGE) 2.5 MG tablet Take 1 tablet (2.5 mg) by mouth at onset of headache for migraine (repeat in 4 hours if needed) May repeat in 4 hours. Max 2 tablets/24 hours. 18 tablet 3     norethindrone (MICRONOR) 0.35 MG tablet Take 1 tablet (0.35 mg) by mouth daily 84 tablet 3     Omega-3 Fatty Acids (FISH OIL PO)        ondansetron (ZOFRAN) 4 MG tablet Take 1 tablet (4 mg) by mouth every 8 hours as needed for nausea 20 tablet 11     Prenatal Multivit-Min-Fe-FA (PRENATAL  VITAMINS PO)        propranolol ER (INDERAL LA) 80 MG 24 hr capsule Take 1 capsule (80 mg) by mouth 2 times daily 180 capsule 3     sertraline (ZOLOFT) 25 MG tablet Take 1 tablet (25 mg) by mouth daily 90 tablet 3     traZODone (DESYREL) 50 MG tablet Take 1 tablet (50 mg) by mouth At Bedtime 30 tablet 3     tretinoin (RETIN-A) 0.025 % external cream Apply a pea-sized amount evenly over face at nighttime before bed. 45 g 11     VITAMIN D, CHOLECALCIFEROL, PO Take by mouth daily       Allergies   Allergen Reactions     Birch Trees      Dust Mites      Hay Fever & [A.R.M.]      Mold      Recent Labs   Lab Test 21  1013 21  0756   A1C 5.4  --    LDL  --  156*   HDL  --  54   TRIG  --  120        Breast Cancer Screening:    Breast CA Risk Assessment (FHS-7) 2021   Do you have a family history of breast, colon, or ovarian cancer? No / Unknown           Pertinent mammograms are reviewed under the imaging tab.    History of abnormal Pap smear: NO - age 30-65 PAP every 5 years with negative HPV co-testing recommended  PAP / HPV Latest Ref Rng & Units 2019 2015 10/10/2012   PAP (Historical) - NIL NIL NIL   HPV16 NEG:Negative Negative - -   HPV18 NEG:Negative Negative - -   HRHPV NEG:Negative Negative - -     Reviewed and updated as needed this visit by clinical staff  Tobacco  Allergies  Meds   Med Hx  Surg Hx  Fam Hx  Soc Hx        Reviewed and updated as needed this visit by Provider                Past Medical History:   Diagnosis Date     Body aches      Chronic headache      LENORE (generalised anxiety disorder) 2015     Headache      Insomnia 12/10/2008     LSIL (low grade squamous intraepithelial lesion) on Pap smear     also +HPV, colps WNL x two      Past Surgical History:   Procedure Laterality Date     ORTHOPEDIC SURGERY  2011    Left Arm     OB History    Para Term  AB Living   4 3 3 0 1 3   SAB TAB Ectopic Multiple Live Births   0 0 0 0 3      # Outcome Date  "GA Lbr Kayden/2nd Weight Sex Delivery Anes PTL Lv   4 Term 16    M    MAAME   3 Term 13    F    MAAME   2 Term 11 [id number]   M    MAAME      Name: Rachid Arenas AB               Birth Comments: first trimester       Review of Systems   Constitutional: Negative for chills and fever.   HENT: Negative for congestion, ear pain, hearing loss and sore throat.    Eyes: Negative for pain and visual disturbance.   Respiratory: Negative for cough and shortness of breath.    Cardiovascular: Negative for chest pain, palpitations and peripheral edema.   Gastrointestinal: Negative for abdominal pain, constipation, diarrhea, heartburn, hematochezia and nausea.   Breasts:  Negative for tenderness, breast mass and discharge.   Genitourinary: Negative for dysuria, frequency, genital sores, hematuria, pelvic pain, urgency, vaginal bleeding and vaginal discharge.   Musculoskeletal: Negative for arthralgias, joint swelling and myalgias.   Skin: Negative for rash.   Neurological: Negative for dizziness, weakness, headaches and paresthesias.   Psychiatric/Behavioral: Negative for mood changes. The patient is not nervous/anxious.      Family History   Problem Relation Age of Onset     Allergies Mother      Hypertension Mother      Mental Illness Mother      Hypertension Father      Mental Illness Father      Family History Negative Paternal Grandmother      Heart Disease Maternal Grandmother      Obesity Maternal Grandmother      Diabetes Maternal Grandmother      Cancer Maternal Grandfather      Heart Disease Paternal Grandfather      Obesity Paternal Grandfather      Diabetes Paternal Grandfather      Family History Negative Sister      Family History Negative Brother      Family History Negative Brother        OBJECTIVE:   /78 (BP Location: Left arm, Patient Position: Sitting, Cuff Size: Adult Regular)   Pulse 68   Temp 97.7  F (36.5  C) (Temporal)   Resp 16   Ht 1.664 m (5' 5.5\")   Wt 86.2 kg (190 lb)   LMP " 07/15/2021 (Exact Date)   SpO2 100%   BMI 31.14 kg/m    Physical Exam  GENERAL: healthy, alert and no distress  EYES: Eyes grossly normal to inspection, PERRL and conjunctivae and sclerae normal  HENT: ear canals and TM's normal, nose and mouth without ulcers or lesions  NECK: no adenopathy, no asymmetry, masses, or scars and thyroid normal to palpation  RESP: lungs clear to auscultation - no rales, rhonchi or wheezes  BREAST: normal without masses, tenderness or nipple discharge and no palpable axillary masses or adenopathy  CV: regular rate and rhythm, normal S1 S2, no S3 or S4, no murmur, click or rub, no peripheral edema and peripheral pulses strong  ABDOMEN: soft, nontender, no hepatosplenomegaly, no masses and bowel sounds normal  MS: no gross musculoskeletal defects noted, no edema  SKIN: no suspicious lesions or rashes  NEURO: Normal strength and tone, mentation intact and speech normal  PSYCH: mentation appears normal, affect normal/bright        ASSESSMENT/PLAN:   1. Routine general medical examination at a health care facility  Well woman, no concerns    2. Mild major depression (H)  Stable, at goal on current medication  - sertraline (ZOLOFT) 25 MG tablet; Take 1 tablet (25 mg) by mouth daily  Dispense: 90 tablet; Refill: 3    3. Pitted keratolysis  Renewed meds today  - aluminum chloride (DRYSOL) 20 % external solution; APPLY TOPICALLY TO DRY SKIN EVERY NIGHT AT BEDTIME. WASH OFF IN MORNING  Dispense: 60 mL; Refill: 4    4. Surveillance of previously prescribed contraceptive pill  Renewed meds  - norethindrone (MICRONOR) 0.35 MG tablet; Take 1 tablet (0.35 mg) by mouth daily  Dispense: 84 tablet; Refill: 3    5. Facial aging  Renewed meds  - tretinoin (RETIN-A) 0.025 % external cream; Apply a pea-sized amount evenly over face at nighttime before bed.  Dispense: 45 g; Refill: 11    6. CARDIOVASCULAR SCREENING; LDL GOAL LESS THAN 160  - Lipid panel reflex to direct LDL Fasting; Future  - Lipid panel  "reflex to direct LDL Fasting    7. Screening for diabetes mellitus  - Hemoglobin A1c; Future  - Hemoglobin A1c    COUNSELING:  Reviewed preventive health counseling, as reflected in patient instructions    Estimated body mass index is 31.14 kg/m  as calculated from the following:    Height as of this encounter: 1.664 m (5' 5.5\").    Weight as of this encounter: 86.2 kg (190 lb).    Weight management plan: Discussed healthy diet and exercise guidelines    She reports that she has never smoked. She has never used smokeless tobacco.      Counseling Resources:  ATP IV Guidelines  Pooled Cohorts Equation Calculator  Breast Cancer Risk Calculator  BRCA-Related Cancer Risk Assessment: FHS-7 Tool  FRAX Risk Assessment  ICSI Preventive Guidelines  Dietary Guidelines for Americans, 2010  USDA's MyPlate  ASA Prophylaxis  Lung CA Screening    Julia Bashir MD  [facility]  "

## 2021-08-05 NOTE — RESULT ENCOUNTER NOTE
Hello! Thank you for getting labs done. Your lab test to check for diabetes, HgA1C, also called glycosylated hemoglobin, which measures the level of sugar in your blood over the past few months, is normal which is great!     Congratulations, you don't have diabetes!  However, since your fasting blood sugars have been high in the past, I will continue to screen your blood sugar every year.     If you have any questions, please contact the clinic or schedule an appointment with me, thank you!    Sincerely,    PAULA WELSH MD   8/5/2021

## 2021-08-06 LAB
CHOLEST SERPL-MCNC: 271 MG/DL
FASTING STATUS PATIENT QL REPORTED: YES
HDLC SERPL-MCNC: 51 MG/DL
LDLC SERPL CALC-MCNC: 194 MG/DL
NONHDLC SERPL-MCNC: 220 MG/DL
TRIGL SERPL-MCNC: 131 MG/DL

## 2021-08-11 ENCOUNTER — OFFICE VISIT (OUTPATIENT)
Dept: DERMATOLOGY | Facility: CLINIC | Age: 46
End: 2021-08-11
Attending: DERMATOLOGY
Payer: COMMERCIAL

## 2021-08-11 VITALS — DIASTOLIC BLOOD PRESSURE: 85 MMHG | SYSTOLIC BLOOD PRESSURE: 122 MMHG | HEART RATE: 71 BPM

## 2021-08-11 DIAGNOSIS — L72.0 EIC (EPIDERMAL INCLUSION CYST): Primary | ICD-10-CM

## 2021-08-11 PROCEDURE — 11402 EXC TR-EXT B9+MARG 1.1-2 CM: CPT | Mod: GC | Performed by: DERMATOLOGY

## 2021-08-11 PROCEDURE — 12031 INTMD RPR S/A/T/EXT 2.5 CM/<: CPT | Mod: GC | Performed by: DERMATOLOGY

## 2021-08-11 PROCEDURE — 88307 TISSUE EXAM BY PATHOLOGIST: CPT | Performed by: PATHOLOGY

## 2021-08-11 ASSESSMENT — PAIN SCALES - GENERAL: PAINLEVEL: NO PAIN (0)

## 2021-08-11 NOTE — PROGRESS NOTES
[facility] Dermatology Cystic Nodule Excision with Intermediate Closure    Dermatology Problem List:  #. Facial aging/rhytides.  - Tretinoin 0.025% cream at bedtime  - CE Ferulic   - daily sunscreen  #. EIC, central chest, s/p excision 8/11/2021     INDICATIONS: Excision was indicated.  We discussed the principles of treatment and most likely complications including scarring, bleeding, infection, incomplete excision, wound dehiscence, pain, nerve damage, and recurrence. Informed consent was obtained and the patient underwent the procedure as follows:     PROCEDURE:  With the patient in a supine position, the lesion was outlined and marked.  The lesion and the necessary margin (excised diameter) measured 2.0 cm.  A linear incision was designed over the lesion to conform to relaxed skin tension lines in an effort to minimize scarring and deformity. The lesion and surrounding skin were prepped with Hibiclens, draped, and anesthetized with lidocaine with epi.  Using a #15 blade a linear incision was made along premarked line. The resulting defect extended through the dermis.  The cystic nodule was shelled out through the linear opening.  Bleeding vessels were controlled with electrodesiccation.  The dermis and subcutaneous tissue were closed with buried vertical mattress sutures.  Epidermal approximation was meticulously refined with 4-0 Monocryl, resulting in a linear closure with little to no wound tension.  Blood loss was estimated to be less than 5cc.  The area was coated with petrolatum and covered with a non-adherent dressing followed by gauze and tape.  Postoperative instructions were reviewed per protocol.  The patient left alert and fully oriented.    Post-Operative Size:  2.0 cm  Sutures Used:  4-0 monocryl    The Attending surgeon was present for key portions of the above major procedure and examination. procedure. The attending was always immediately available.    Ousmane Oneill  MD  Micrographic Surgery and Dermatologic Oncology (MSDO) Fellow    Scribe Disclosure:  I, Nessa Amezcua, am serving as a scribe to document services personally performed by Kwabena Osman MD based on data collection and the provider's statements to me.       Attending attestation:  I was present for key elements of the procedure and immediately available for all other portions of the procedure.  I have reviewed the note and edited it as necessary.    Kwabena Osman M.D.  Professor  Director of Dermatologic Surgery  Department of Dermatology  [facility]    Dermatology Surgery Clinic  [facility] Surgery [place] [phone]

## 2021-08-11 NOTE — PATIENT INSTRUCTIONS
Excision/Mohs Wound Care Instructions  I will experience scar, altered skin color, bleeding, swelling, pain, crusting and redness. I may experience altered sensation. Risks are excessive bleeding, infection, muscle weakness, thick (hypertrophic or keloidal) scar, and recurrence. A second procedure may be recommended to obtain the best cosmetic or functional result.  Possible complications of any surgical procedure are bleeding, infection, scarring, alteration in skin color and sensation, muscle weakness in the area, wound dehiscence or seperation, or recurrence of the lesion or disease. On occasion, after healing, a secondary procedure or revision may be recommended in order to obtain the best cosmetic or functional result.   After your surgery, a pressure bandage will be placed over the area that has sutures. This will help prevent bleeding  For the First 48 hours After Surgery:  1. Leave the pressure bandage on and keep it dry. If it should come loose, you may retape it, but do not take it off.  2. Relax and take it easy. Do not do any vigorous exercise, heavy lifting, or bending forward. This could cause the wound to bleed.  3. Post-operative pain is usually mild. You may take plain or extra strength Tylenol every 4 hours as needed (do not take more than 4,000mg in one day). Do not take any medicine that contains aspirin, ibuprofen or motrin unless you have been recommended these by a doctor.  Avoid alcohol and vitamin E as these may increase your tendency to bleed.  4. You may put an ice pack around the bandaged area for 20 minutes every 2-3 hours. This may help reduce swelling, bruising, and pain. Make sure the ice pack is waterproof so that the pressure bandage does not get wet.   5. You may see a small amount of drainage or blood on your pressure bandage. This is normal. However, if drainage or bleeding continues or saturates the bandage, you will need to apply firm pressure over the bandage with a washcloth  for 15 minutes. If bleeding continues after applying pressure for 15 minutes then go to the nearest emergency room.  48 Hours After Surgery  Carefully remove the bandage and start daily wound care and dressing changes. You may also now shower and get the wound wet.  Daily Wound Care:  1. Wash wound with a mild soap and water.  Use caution when washing the wound, be gentle and do not let the forceful shower stream hit the wound directly.  2. Pat dry.  3. Apply Vaseline (from a new container or tube) over the suture line with a Q-tip. It is very important to keep the wound continuously moist, as wounds heal best in a moist environment.  4. Keep the site covered until sutures are removed, you can cover it with a Telfa (non-stick) dressing and tape or a band-aid.    5. If you are unable to keep wound covered, you must apply Vaseline every 2-3 hours (while awake) to ensure it is being kept moist for optimal healing. A dressing overnight is recommended to keep the area moist.  Call Us If:  1. You have pain that is not controlled with Tylenol.  2. You have signs or symptoms of an infection, such as: fever over 100 degrees F, redness, warmth, or foul-smelling or yellow/creamy drainage from the wound.  Who should I call with questions?    [facility]: [phone]     [facility]: [phone]    For urgent needs outside of business hours call the [facility] at [phone] and ask to speak with the dermatology resident on call

## 2021-08-11 NOTE — LETTER
8/11/2021       RE: Ruthie Nash  [address] [address] [place] [address]     Dear Colleague,    Thank you for referring your patient, Ruthie Nash, to the [facility] [place] [facility]. Please see a copy of my visit note below.    [facility] Dermatology Cystic Nodule Excision with Intermediate Closure    Dermatology Problem List:  #. Facial aging/rhytides.  - Tretinoin 0.025% cream at bedtime  - CE Ferulic   - daily sunscreen  #. EIC, central chest, s/p excision 8/11/2021     INDICATIONS: Excision was indicated.  We discussed the principles of treatment and most likely complications including scarring, bleeding, infection, incomplete excision, wound dehiscence, pain, nerve damage, and recurrence. Informed consent was obtained and the patient underwent the procedure as follows:     PROCEDURE:  With the patient in a supine position, the lesion was outlined and marked.  The lesion and the necessary margin (excised diameter) measured 2.0 cm.  A linear incision was designed over the lesion to conform to relaxed skin tension lines in an effort to minimize scarring and deformity. The lesion and surrounding skin were prepped with Hibiclens, draped, and anesthetized with lidocaine with epi.  Using a #15 blade a linear incision was made along premarked line. The resulting defect extended through the dermis.  The cystic nodule was shelled out through the linear opening.  Bleeding vessels were controlled with electrodesiccation.  The dermis and subcutaneous tissue were closed with buried vertical mattress sutures.  Epidermal approximation was meticulously refined with 4-0 Monocryl, resulting in a linear closure with little to no wound tension.  Blood loss was estimated to be less than 5cc.  The area was coated with petrolatum and covered with a non-adherent dressing followed by gauze and tape.  Postoperative  instructions were reviewed per protocol.  The patient left alert and fully oriented.    Post-Operative Size:  2.0 cm  Sutures Used:  4-0 monocryl    The Attending surgeon was present for key portions of the above major procedure and examination. procedure. The attending was always immediately available.    Ousmane Oneill MD  Micrographic Surgery and Dermatologic Oncology (MSDO) Fellow    Scribe Disclosure:  I, Nessa Amezcua, am serving as a scribe to document services personally performed by Kwabena Osman MD based on data collection and the provider's statements to me.       Attending attestation:  I was present for key elements of the procedure and immediately available for all other portions of the procedure.  I have reviewed the note and edited it as necessary.    Kwabena Osman M.D.  Professor  Director of Dermatologic Surgery  Department of Dermatology  [facility]    Dermatology Surgery Clinic  [facility]  [address] [place] [id number]

## 2021-08-12 ENCOUNTER — OFFICE VISIT (OUTPATIENT)
Dept: ORTHOPEDICS | Facility: CLINIC | Age: 46
End: 2021-08-12
Payer: COMMERCIAL

## 2021-08-12 VITALS — WEIGHT: 190 LBS | HEIGHT: 66 IN | BODY MASS INDEX: 30.53 KG/M2

## 2021-08-12 DIAGNOSIS — M54.2 NECK PAIN: ICD-10-CM

## 2021-08-12 PROCEDURE — 99214 OFFICE O/P EST MOD 30 MIN: CPT | Performed by: FAMILY MEDICINE

## 2021-08-12 RX ORDER — CYCLOBENZAPRINE HCL 5 MG
5 TABLET ORAL 3 TIMES DAILY PRN
Qty: 42 TABLET | Refills: 1 | Status: SHIPPED | OUTPATIENT
Start: 2021-08-12

## 2021-08-12 ASSESSMENT — MIFFLIN-ST. JEOR: SCORE: 1520.64

## 2021-08-12 NOTE — PROGRESS NOTES
"  [facility] CLINICS AND SURGERY CENTER  SPORTS & ORTHOPEDIC CLINIC VISIT     Aug 12, 2021        ASSESSMENT & PLAN    46-year-old with chronic recurrent neck pain that seems mostly myofascial at the current time.    Reviewed imaging and assessment with patient in detail  Recommended continued home exercises.  This is likely something that she will need to continue in some capacity indefinitely.  Recommended regular daily exercises.  Provided refill for cyclobenzaprine to be used on as-needed basis.  She was also provided with an external referral for physical therapy.  Follow-up as needed    Praneeth Hill MD  [facility] SPORTS MEDICINE CLINIC [place]    -----  Chief Complaint   Patient presents with     RECHECK     Neck pain       SUBJECTIVE  Ruthie Nash is a/an 46 year old female who is seen for follow up of neck pain.     The patient is seen by themselves.    Date of injury: Jan 2019  Date of Last Visit: 7/26/2020   Symptoms: improved  Worsened by: Typing at a desk, carrying heavy objects on one shoulder  Better with: Medication-interested in a refill today, Physical Therapy  Treatment to date: physical therapy and previous imaging (MRI and xray)  Associated symptoms: numbness and tingling in right arm        REVIEW OF SYSTEMS:    See HPI     OBJECTIVE:  Ht 1.664 m (5' 5.5\")   Wt 86.2 kg (190 lb)   LMP 07/15/2021 (Exact Date)   BMI 31.14 kg/m       General: Alert, pleasant, no distress.  No further exam this visit    RADIOLOGY:  No new imaging        "

## 2021-08-12 NOTE — LETTER
"  8/12/2021      RE: Ruthie Nash  [address]  [place] [address]         [facility]  SPORTS & ORTHOPEDIC CLINIC VISIT     Aug 12, 2021        ASSESSMENT & PLAN    46-year-old with chronic recurrent neck pain that seems mostly myofascial at the current time.    Reviewed imaging and assessment with patient in detail  Recommended continued home exercises.  This is likely something that she will need to continue in some capacity indefinitely.  Recommended regular daily exercises.  Provided refill for cyclobenzaprine to be used on as-needed basis.  She was also provided with an external referral for physical therapy.  Follow-up as needed    Praneeth Hill MD  [facility] SP[facility]IC[facility] [place]    -----  Chief Complaint   Patient presents with     RECHECK     Neck pain       SUBJECTIVE  Ruthie Nash is a/an 46 year old female who is seen for follow up of neck pain.     The patient is seen by themselves.    Date of injury: Jan 2019  Date of Last Visit: 7/26/2020   Symptoms: improved  Worsened by: Typing at a desk, carrying heavy objects on one shoulder  Better with: Medication-interested in a refill today, Physical Therapy  Treatment to date: physical therapy and previous imaging (MRI and xray)  Associated symptoms: numbness and tingling in right arm        REVIEW OF SYSTEMS:    See HPI     OBJECTIVE:  Ht 1.664 m (5' 5.5\")   Wt 86.2 kg (190 lb)   LMP 07/15/2021 (Exact Date)   BMI 31.14 kg/m       General: Alert, pleasant, no distress.  No further exam this visit    RADIOLOGY:  No new imaging        Praneeth Hill MD    "

## 2021-08-17 LAB
PATH REPORT.COMMENTS IMP SPEC: NORMAL
PATH REPORT.COMMENTS IMP SPEC: NORMAL
PATH REPORT.FINAL DX SPEC: NORMAL
PATH REPORT.GROSS SPEC: NORMAL
PATH REPORT.MICROSCOPIC SPEC OTHER STN: NORMAL
PATH REPORT.RELEVANT HX SPEC: NORMAL

## 2021-09-26 ENCOUNTER — HEALTH MAINTENANCE LETTER (OUTPATIENT)
Age: 46
End: 2021-09-26

## 2021-10-19 PROBLEM — F32.9 MAJOR DEPRESSION: Status: ACTIVE | Noted: 2021-08-05

## 2022-03-12 ENCOUNTER — HEALTH MAINTENANCE LETTER (OUTPATIENT)
Age: 47
End: 2022-03-12

## 2022-06-20 ENCOUNTER — TELEPHONE (OUTPATIENT)
Dept: NEUROLOGY | Facility: CLINIC | Age: 47
End: 2022-06-20
Payer: COMMERCIAL

## 2023-04-23 ENCOUNTER — HEALTH MAINTENANCE LETTER (OUTPATIENT)
Age: 48
End: 2023-04-23

## 2024-06-29 ENCOUNTER — HEALTH MAINTENANCE LETTER (OUTPATIENT)
Age: 49
End: 2024-06-29